# Patient Record
Sex: FEMALE | Race: WHITE | Employment: FULL TIME | URBAN - METROPOLITAN AREA
[De-identification: names, ages, dates, MRNs, and addresses within clinical notes are randomized per-mention and may not be internally consistent; named-entity substitution may affect disease eponyms.]

---

## 2017-07-27 ENCOUNTER — OFFICE VISIT (OUTPATIENT)
Dept: OBGYN CLINIC | Age: 22
End: 2017-07-27

## 2017-07-27 VITALS
DIASTOLIC BLOOD PRESSURE: 72 MMHG | HEIGHT: 65 IN | SYSTOLIC BLOOD PRESSURE: 110 MMHG | BODY MASS INDEX: 18.43 KG/M2 | WEIGHT: 110.6 LBS

## 2017-07-27 DIAGNOSIS — Z11.3 SCREENING EXAMINATION FOR VENEREAL DISEASE: Primary | ICD-10-CM

## 2017-07-27 DIAGNOSIS — Z01.419 ENCOUNTER FOR GYNECOLOGICAL EXAMINATION WITHOUT ABNORMAL FINDING: ICD-10-CM

## 2017-07-27 NOTE — PATIENT INSTRUCTIONS
Breast Self-Exam: Care Instructions  Your Care Instructions  A breast self-exam is when you check your breasts for lumps or changes. This regular exam helps you learn how your breasts normally look and feel. Most breast problems or changes are not because of cancer. Breast self-exam is not a substitute for a mammogram. Having regular breast exams by your doctor and regular mammograms improve your chances of finding any problems with your breasts. Some women set a time each month to do a step-by-step breast self-exam. Other women like a less formal system. They might look at their breasts as they brush their teeth, or feel their breasts once in a while in the shower. If you notice a change in your breast, tell your doctor. Follow-up care is a key part of your treatment and safety. Be sure to make and go to all appointments, and call your doctor if you are having problems. Its also a good idea to know your test results and keep a list of the medicines you take. How do you do a breast self-exam?  · The best time to examine your breasts is usually one week after your menstrual period begins. Your breasts should not be tender then. If you do not have periods, you might do your exam on a day of the month that is easy to remember. · To examine your breasts:  ¨ Remove all your clothes above the waist and lie down. When you are lying down, your breast tissue spreads evenly over your chest wall, which makes it easier to feel all your breast tissue. ¨ Use the pads--not the fingertips--of the 3 middle fingers of your left hand to check your right breast. Move your fingers slowly in small coin-sized circles that overlap. ¨ Use three levels of pressure to feel of all your breast tissue. Use light pressure to feel the tissue close to the skin surface. Use medium pressure to feel a little deeper. Use firm pressure to feel your tissue close to your breastbone and ribs.  Use each pressure level to feel your breast tissue before moving on to the next spot. ¨ Check your entire breast, moving up and down as if following a strip from the collarbone to the bra line, and from the armpit to the ribs. Repeat until you have covered the entire breast.  ¨ Repeat this procedure for your left breast, using the pads of the 3 middle fingers of your right hand. · To examine your breasts while in the shower:  ¨ Place one arm over your head and lightly soap your breast on that side. ¨ Using the pads of your fingers, gently move your hand over your breast (in the strip pattern described above), feeling carefully for any lumps or changes. ¨ Repeat for the other breast.  · Have your doctor inspect anything you notice to see if you need further testing. Where can you learn more? Go to http://jose de jesus-eulalio.info/. Enter P148 in the search box to learn more about \"Breast Self-Exam: Care Instructions. \"  Current as of: July 26, 2016  Content Version: 11.3  © 0657-0981 [employer]. Care instructions adapted under license by [employer] (which disclaims liability or warranty for this information). If you have questions about a medical condition or this instruction, always ask your healthcare professional. [place] [address] any warranty or liability for your use of this information.

## 2017-07-27 NOTE — PROGRESS NOTES
Johanna Fermin is a [de-identified] P5,  25 y.o. female [facility] whose Patient's last menstrual period was 07/17/2017 (exact date). who presents for her annual checkup. With regard to the Gardisil vaccine, she has received all 3 injections. Menstrual status:    Her periods are light in flow. She is using three to five pads or tampons per day, usually regular every 26-30 days. She denies dysmenorrhea. She reports no premenstrual symptoms. Contraception:    The current method of family planning is Ortho-Evra patches weekly and She declines contraception and counseling. Sexual history:    She  reports that she currently engages in sexual activity and has had male partners. Medical conditions:    Since her last annual GYN exam about one year ago, she has not the following changes in her health history: none. Pap and Mammogram History:    Her most recent Pap smear was normal obtained 7/7/2016    The patient has never had a mammogram.    The patient does not have a family history of breast cancer. Past Medical History:   Diagnosis Date    Acne     HPV vaccine counseling     Pt completed Gardasil series 10/2013     Past Surgical History:   Procedure Laterality Date    HX HEENT      Cyst removed        Current Outpatient Prescriptions   Medication Sig Dispense Refill    norelgestromin-ethinyl estradiol (ORTHO EVRA) 150-35 mcg/24 hr One patch weekly continuously, no patch free week. 12 Patch 5     Allergies: Review of patient's allergies indicates no known allergies. Social History     Social History    Marital status:      Spouse name: N/A    Number of children: N/A    Years of education: N/A     Occupational History    Not on file. Social History Main Topics    Smoking status: Never Smoker    Smokeless tobacco: Never Used    Alcohol use No    Drug use: Not on file    Sexual activity: Yes     Partners: Male      Comment: O.  Evra patch     Other Topics Concern    Not on file     Social History Narrative     Tobacco History:  reports that she has never smoked. She has never used smokeless tobacco.  Alcohol Abuse:  reports that she does not drink alcohol. Drug Abuse:  has no drug history on file. There is no problem list on file for this patient.       Review of Systems - History obtained from the patient  Constitutional: negative for weight loss, fever, night sweats  HEENT: negative for hearing loss, earache, congestion, snoring, sorethroat  CV: negative for chest pain, palpitations, edema  Resp: negative for cough, shortness of breath, wheezing  GI: negative for change in bowel habits, abdominal pain, black or bloody stools  : negative for frequency, dysuria, hematuria, vaginal discharge  MSK: negative for back pain, joint pain, muscle pain  Breast: negative for breast lumps, nipple discharge, galactorrhea  Skin :negative for itching, rash, hives  Neuro: negative for dizziness, headache, confusion, weakness  Psych: negative for anxiety, depression, change in mood  Heme/lymph: negative for bleeding, bruising, pallor    Physical Exam    Visit Vitals    /72    Ht 5' 5\" (1.651 m)    Wt 110 lb 9.6 oz (50.2 kg)    LMP 07/17/2017 (Exact Date)    BMI 18.4 kg/m2       Constitutional  · Appearance: well-nourished, well developed, alert, in no acute distress    HENT  · Head and Face: appears normal    Neck  · Inspection/Palpation: normal appearance, no masses or tenderness  · Lymph Nodes: no lymphadenopathy present  · Thyroid: gland size normal, nontender, no nodules or masses present on palpation    Chest  · Respiratory Effort: breathing normal  · Auscultation: normal breath sounds    Cardiovascular  · Heart:  · Auscultation: regular rate and rhythm without murmur    Breasts  · Inspection of Breasts: breasts symmetrical, no skin changes, no discharge present, nipple appearance normal, no skin retraction present  · Palpation of Breasts and Axillae: no masses present on palpation, no breast tenderness  · Axillary Lymph Nodes: no lymphadenopathy present    Gastrointestinal  · Abdominal Examination: abdomen non-tender to palpation, normal bowel sounds, no masses present  · Liver and spleen: no hepatomegaly present, spleen not palpable  · Hernias: no hernias identified    Genitourinary  · External Genitalia: normal appearance for age, no discharge present, no tenderness present, no inflammatory lesions present, no masses present, no atrophy present  · Vagina: normal vaginal vault without central or paravaginal defects, no discharge present, no inflammatory lesions present, no masses present  · Bladder: non-tender to palpation  · Urethra: appears normal  · Cervix: normal   · Uterus: normal size, shape and consistency  · Adnexa: no adnexal tenderness present, no adnexal masses present  · Perineum: perineum within normal limits, no evidence of trauma, no rashes or skin lesions present  · Anus: anus within normal limits, no hemorrhoids present  · Inguinal Lymph Nodes: no lymphadenopathy present    Skin  · General Inspection: no rash, no lesions identified    Neurologic/Psychiatric  · Mental Status:  · Orientation: grossly oriented to person, place and time  · Mood and Affect: mood normal, affect appropriate    . Assessment:  Routine gynecologic examination  Her current medical status is satisfactory with no evidence of significant gynecologic issues.     Plan:  Counseled re: diet, exercise, healthy lifestyle  Return for yearly wellness visits  [facility]

## 2017-07-30 LAB
C TRACH RRNA SPEC QL NAA+PROBE: NEGATIVE
N GONORRHOEA RRNA SPEC QL NAA+PROBE: NEGATIVE
T VAGINALIS RRNA SPEC QL NAA+PROBE: NEGATIVE

## 2017-10-19 ENCOUNTER — OFFICE VISIT (OUTPATIENT)
Dept: OBGYN CLINIC | Age: 22
End: 2017-10-19

## 2017-10-19 VITALS
BODY MASS INDEX: 18.33 KG/M2 | HEIGHT: 65 IN | DIASTOLIC BLOOD PRESSURE: 58 MMHG | SYSTOLIC BLOOD PRESSURE: 110 MMHG | WEIGHT: 110 LBS

## 2017-10-19 DIAGNOSIS — Z20.2 POSSIBLE EXPOSURE TO STD: Primary | ICD-10-CM

## 2017-10-19 DIAGNOSIS — Z11.3 SCREENING EXAMINATION FOR VENEREAL DISEASE: ICD-10-CM

## 2017-10-19 NOTE — PATIENT INSTRUCTIONS
Pelvic Exam: Care Instructions  Your Care Instructions    When your doctor examines all of your pelvic organs, it's called a pelvic exam. Two good reasons to have this kind of exam are to check for sexually transmitted infections (STIs) and to get a Pap test. A Pap test is also called a Pap smear. It checks for early changes that can lead to cancer of the cervix. Sometimes a pelvic exam is part of a regular checkup. In this case, you can do some things to make your test results as accurate as possible. · Try to schedule the exam when you don't have your period. · Don't use douches, tampons, or vaginal medicines, sprays, or powders for 24 hours before your exam.  · Don't have sex for 24 hours before your exam.  Other times, women have this kind of exam at any time of the month. This is because they have pelvic pain, bleeding, or discharge. Or they may have another pelvic problem. Before your exam, it's important to share some information with your doctor. For example, if you are a survivor of rape or sexual abuse, you can talk about any concerns you may have. Your doctor will also want to know if you are pregnant or use birth control. And he or she will want to hear about any problems, surgeries, or procedures you have had in your pelvic area. You will also need to tell your doctor when your last period was. Follow-up care is a key part of your treatment and safety. Be sure to make and go to all appointments, and call your doctor if you are having problems. It's also a good idea to know your test results and keep a list of the medicines you take. How is a pelvic exam done? · During a pelvic exam, you will:  ¨ Take off your clothes below the waist. You will get a paper or cloth cover to put over the lower half of your body. Zahida Christine on your back on an exam table. Your feet will be raised above you. Stirrups will support your feet. · The doctor will:  Maykel Hastings you to relax your knees.  Your knees need to lean out, toward the walls. ¨ Check the opening of your vagina for sores or swelling. ¨ Gently put a tool called a speculum into your vagina. It opens the vagina a little bit. You will feel some pressure. But if you are relaxed, it will not hurt. It lets your doctor see inside the vagina. ¨ Use a small brush, spatula, or swab to get a sample of cells, if you are having a Pap test or culture. The doctor then removes the speculum. ¨ Put on gloves and put one or two fingers of one hand into your vagina. The other hand goes on your lower belly. This lets your doctor feel your pelvic organs. You will probably feel some pressure. Try to stay relaxed. ¨ Put one gloved finger into your rectum and one into your vagina, if needed. This can also help check your pelvic organs. This exam takes about 10 minutes. At the end, you will get a washcloth or tissue to clean your vaginal area. It's normal to have some discharge after this exam. You can then get dressed. Some test results may be ready right away. But results from a culture or a Pap test may take several days or a few weeks. Why should you have a pelvic exam?  · You want to have recommended screening tests. This includes a Pap test.  · You think you have a vaginal infection. Signs include itching, burning, or unusual discharge. · You might have been exposed to a sexually transmitted infection (STI), such as chlamydia or herpes. · You have vaginal bleeding that is not part of your normal menstrual period. · You have pain in your belly or pelvis. · You have been sexually assaulted. A pelvic exam lets your doctor collect evidence and check for STIs. · You are pregnant. · You are having trouble getting pregnant. What are the risks of a pelvic exam?  There are no risks from a pelvic exam.  When should you call for help?   Watch closely for changes in your health, and be sure to contact your doctor if:  · You have heavy bleeding or discharge from your vagina after the exam.  Where can you learn more? Go to http://jose de jesus-eulalio.info/. Enter E695 in the search box to learn more about \"Pelvic Exam: Care Instructions. \"  Current as of: October 13, 2016  Content Version: 11.3  © 3150-1839 [employer]. Care instructions adapted under license by [employer] (which disclaims liability or warranty for this information). If you have questions about a medical condition or this instruction, always ask your healthcare professional. [place] [address] any warranty or liability for your use of this information.

## 2017-10-19 NOTE — PROGRESS NOTES
Chief Complaint   Exposure to STD      HPI  Trupti Vee is a 25 y.o. female who presents for the evaluation of possible STI. Patient's last menstrual period was 10/02/2017. She admits to  symptoms at this time. Patient states her tonsils swell and she gets a fever for a few days the last 2 months and is concerned she has an STD. She thinks her  may have cheated on her. The patient  denies risk factors for sexually-transmitted infection. She denies a history of having unprotected intercourse. Previous STD history: none    Past Medical History:   Diagnosis Date    Acne     HPV vaccine counseling     Pt completed Gardasil series 10/2013     Past Surgical History:   Procedure Laterality Date    HX HEENT      Cyst removed      Social History     Occupational History    Not on file. Social History Main Topics    Smoking status: Never Smoker    Smokeless tobacco: Never Used    Alcohol use No    Drug use: Not on file    Sexual activity: Yes     Partners: Male      Comment: O. Evra patch     History reviewed. No pertinent family history. No Known Allergies  Prior to Admission medications    Medication Sig Start Date End Date Taking?  Authorizing Provider   norelgestromin-ethinyl estradiol (ORTHO EVRA) 150-35 mcg/24 hr One patch weekly 7/27/17  Yes Brandon Morillo MD        Review of Systems: History obtained from the patient  Constitutional: negative for weight loss, fever, night sweats  Breast: negative for breast lumps, nipple discharge, galactorrhea  GI: negative for change in bowel habits, abdominal pain, black or bloody stools  : negative for frequency, dysuria, hematuria, vaginal discharge  MSK: negative for back pain, joint pain, muscle pain  Skin: negative for itching, rash, hives  Psych: negative for anxiety, depression, change in mood    Objective:  Visit Vitals    /58    Ht 5' 5\" (1.651 m)    Wt 110 lb (49.9 kg)    LMP 10/02/2017    BMI 18.3 kg/m2       PHYSICAL EXAMINATION    Constitutional  · Appearance: well-nourished, well developed, alert, in no acute distress    Gastrointestinal  · Abdominal Examination: abdomen non-tender to palpation, normal bowel sounds, no masses present  · Liver and spleen: no hepatomegaly present, spleen not palpable  · Hernias: no hernias identified    Genitourinary  · External Genitalia: normal appearance for age, no discharge present, no tenderness present, no inflammatory lesions present, no masses present, no atrophy present  · Vagina: normal vaginal vault without central or paravaginal defects, no discharge present, no inflammatory lesions present, no masses present  · Bladder: non-tender to palpation  · Urethra: appears normal  · Cervix: normal   · Uterus: normal size, shape and consistency  · Adnexa: no adnexal tenderness present, no adnexal masses present  · Perineum: perineum within normal limits, no evidence of trauma, no rashes or skin lesions present  · Anus: anus within normal limits, no hemorrhoids present  · Inguinal Lymph Nodes: no lymphadenopathy present    Skin  · General Inspection: no rash, no lesions identified    Neurologic/Psychiatric  · Mental Status:  · Orientation: grossly oriented to person, place and time  · Mood and Affect: mood normal, affect appropriate      . Assesment:   STD exposure    Plan:   GC and chlamydia DNA  probe sent to lab. Blood STD testing    We discussed STI issues including treatment options, the necessity of treating partners and prevention of transmission. ROV prn if symptoms persist or worsen.

## 2017-10-20 LAB
COMMENT, 144067: NORMAL
HBV SURFACE AG SERPL QL IA: NEGATIVE
HCV AB S/CO SERPL IA: <0.1 S/CO RATIO (ref 0–0.9)
HIV 1+2 AB+HIV1 P24 AG SERPL QL IA: NON REACTIVE
HSV2 IGG SER IA-ACNC: <0.91 INDEX (ref 0–0.9)
RPR SER QL: NON REACTIVE

## 2018-09-24 ENCOUNTER — OFFICE VISIT (OUTPATIENT)
Dept: OBGYN CLINIC | Age: 23
End: 2018-09-24

## 2018-09-24 VITALS
SYSTOLIC BLOOD PRESSURE: 112 MMHG | DIASTOLIC BLOOD PRESSURE: 60 MMHG | HEIGHT: 65 IN | BODY MASS INDEX: 19.39 KG/M2 | WEIGHT: 116.4 LBS

## 2018-09-24 DIAGNOSIS — Z11.3 SCREENING EXAMINATION FOR VENEREAL DISEASE: ICD-10-CM

## 2018-09-24 DIAGNOSIS — Z01.419 ENCOUNTER FOR GYNECOLOGICAL EXAMINATION WITHOUT ABNORMAL FINDING: Primary | ICD-10-CM

## 2018-09-24 NOTE — PATIENT INSTRUCTIONS
Breast Self-Exam: Care Instructions  Your Care Instructions    A breast self-exam is when you check your breasts for lumps or changes. This regular exam helps you learn how your breasts normally look and feel. Most breast problems or changes are not because of cancer. Breast self-exam is not a substitute for a mammogram. Having regular breast exams by your doctor and regular mammograms improve your chances of finding any problems with your breasts. Some women set a time each month to do a step-by-step breast self-exam. Other women like a less formal system. They might look at their breasts as they brush their teeth, or feel their breasts once in a while in the shower. If you notice a change in your breast, tell your doctor. Follow-up care is a key part of your treatment and safety. Be sure to make and go to all appointments, and call your doctor if you are having problems. It's also a good idea to know your test results and keep a list of the medicines you take. How do you do a breast self-exam?  · The best time to examine your breasts is usually one week after your menstrual period begins. Your breasts should not be tender then. If you do not have periods, you might do your exam on a day of the month that is easy to remember. · To examine your breasts:  ¨ Remove all your clothes above the waist and lie down. When you are lying down, your breast tissue spreads evenly over your chest wall, which makes it easier to feel all your breast tissue. ¨ Use the pads-not the fingertips-of the 3 middle fingers of your left hand to check your right breast. Move your fingers slowly in small coin-sized circles that overlap. ¨ Use three levels of pressure to feel of all your breast tissue. Use light pressure to feel the tissue close to the skin surface. Use medium pressure to feel a little deeper. Use firm pressure to feel your tissue close to your breastbone and ribs.  Use each pressure level to feel your breast tissue before moving on to the next spot. ¨ Check your entire breast, moving up and down as if following a strip from the collarbone to the bra line, and from the armpit to the ribs. Repeat until you have covered the entire breast.  ¨ Repeat this procedure for your left breast, using the pads of the 3 middle fingers of your right hand. · To examine your breasts while in the shower:  ¨ Place one arm over your head and lightly soap your breast on that side. ¨ Using the pads of your fingers, gently move your hand over your breast (in the strip pattern described above), feeling carefully for any lumps or changes. ¨ Repeat for the other breast.  · Have your doctor inspect anything you notice to see if you need further testing. Where can you learn more? Go to http://jose de jesus-eulalio.info/. Enter P148 in the search box to learn more about \"Breast Self-Exam: Care Instructions. \"  Current as of: May 12, 2017  Content Version: 11.7  © 9467-5757 [employer]. Care instructions adapted under license by [employer] (which disclaims liability or warranty for this information). If you have questions about a medical condition or this instruction, always ask your healthcare professional. [place] [address] any warranty or liability for your use of this information.

## 2018-09-24 NOTE — PROGRESS NOTES
Cesar Rodriguez is a [de-identified] P5,  21 y.o. female [facility]– [place] whose Patient's last menstrual period was 09/18/2018 (approximate). who presents for her annual checkup. She is having significant anxiety the week of her period. With regard to the Gardisil vaccine, she has received all 3 injections. Menstrual status:    Her periods are normal in flow. She is using three to five pads or tampons per day, usually regular and occur every 26-30 days. She denies dysmenorrhea. She reports no premenstrual symptoms. Contraception:    The current method of family planning is Ortho-Evra patches weekly. Sexual history:    She  reports that she currently engages in sexual activity and has had male partners. Medical conditions:    Since her last annual GYN exam about one year ago, she has not the following changes in her health history: none. Pap and Mammogram History:    Her most recent Pap smear was normal obtained 7/7/2016. The patient has never had a mammogram.    The patient does not have a family history of breast cancer. Past Medical History:   Diagnosis Date    Acne     HPV vaccine counseling     Pt completed Gardasil series 10/2013     Past Surgical History:   Procedure Laterality Date    HX HEENT      Cyst removed        Current Outpatient Prescriptions   Medication Sig Dispense Refill    norelgestromin-ethinyl estradiol (ORTHO EVRA) 150-35 mcg/24 hr One patch weekly 3 Patch 12     Allergies: Review of patient's allergies indicates no known allergies. Social History     Social History    Marital status:      Spouse name: N/A    Number of children: N/A    Years of education: N/A     Occupational History    Not on file. Social History Main Topics    Smoking status: Never Smoker    Smokeless tobacco: Never Used    Alcohol use No    Drug use: Not on file    Sexual activity: Yes     Partners: Male      Comment: O.  Evra patch     Other Topics Concern    Not on file     Social History Narrative     Tobacco History:  reports that she has never smoked. She has never used smokeless tobacco.  Alcohol Abuse:  reports that she does not drink alcohol. Drug Abuse:  has no drug history on file. There is no problem list on file for this patient.       Review of Systems - History obtained from the patient  Constitutional: negative for weight loss, fever, night sweats  HEENT: negative for hearing loss, earache, congestion, snoring, sorethroat  CV: negative for chest pain, palpitations, edema  Resp: negative for cough, shortness of breath, wheezing  GI: negative for change in bowel habits, abdominal pain, black or bloody stools  : negative for frequency, dysuria, hematuria, vaginal discharge  MSK: negative for back pain, joint pain, muscle pain  Breast: negative for breast lumps, nipple discharge, galactorrhea  Skin :negative for itching, rash, hives  Neuro: negative for dizziness, headache, confusion, weakness  Psych: negative for anxiety, depression, change in mood  Heme/lymph: negative for bleeding, bruising, pallor    Physical Exam    Visit Vitals    /60 (BP 1 Location: Left arm, BP Patient Position: Sitting)    Ht 5' 5\" (1.651 m)    Wt 116 lb 6.4 oz (52.8 kg)    LMP 09/18/2018 (Approximate)    BMI 19.37 kg/m2       Constitutional  · Appearance: well-nourished, well developed, alert, in no acute distress    HENT  · Head and Face: appears normal    Neck  · Inspection/Palpation: normal appearance, no masses or tenderness  · Lymph Nodes: no lymphadenopathy present  · Thyroid: gland size normal, nontender, no nodules or masses present on palpation    Chest  · Respiratory Effort: breathing normal  · Auscultation: normal breath sounds    Cardiovascular  · Heart:  · Auscultation: regular rate and rhythm without murmur    Breasts  · Inspection of Breasts: breasts symmetrical, no skin changes, no discharge present, nipple appearance normal, no skin retraction present  · Palpation of Breasts and Axillae: no masses present on palpation, no breast tenderness  · Axillary Lymph Nodes: no lymphadenopathy present    Gastrointestinal  · Abdominal Examination: abdomen non-tender to palpation, normal bowel sounds, no masses present  · Liver and spleen: no hepatomegaly present, spleen not palpable  · Hernias: no hernias identified    Genitourinary  · External Genitalia: normal appearance for age, no discharge present, no tenderness present, no inflammatory lesions present, no masses present, no atrophy present  · Vagina: normal vaginal vault without central or paravaginal defects, no discharge present, no inflammatory lesions present, no masses present  · Bladder: non-tender to palpation  · Urethra: appears normal  · Cervix: normal   · Uterus: normal size, shape and consistency  · Adnexa: no adnexal tenderness present, no adnexal masses present  · Perineum: perineum within normal limits, no evidence of trauma, no rashes or skin lesions present  · Anus: anus within normal limits, no hemorrhoids present  · Inguinal Lymph Nodes: no lymphadenopathy present    Skin  · General Inspection: no rash, no lesions identified    Neurologic/Psychiatric  · Mental Status:  · Orientation: grossly oriented to person, place and time  · Mood and Affect: mood normal, affect appropriate    . Assessment:  Routine gynecologic examination  Her current medical status is satisfactory with no evidence of significant gynecologic issues.     Plan:  Counseled re: diet, exercise, healthy lifestyle  Return for yearly wellness visits  Pap/GCC  May want conception  Cont Evra

## 2018-09-27 LAB
C TRACH RRNA CVX QL NAA+PROBE: NEGATIVE
CYTOLOGIST CVX/VAG CYTO: NORMAL
CYTOLOGY CVX/VAG DOC THIN PREP: NORMAL
DX ICD CODE: NORMAL
LABCORP, 190119: NORMAL
Lab: NORMAL
N GONORRHOEA RRNA CVX QL NAA+PROBE: NEGATIVE
OTHER STN SPEC: NORMAL
PATH REPORT.FINAL DX SPEC: NORMAL
PATHOLOGIST CVX/VAG CYTO: NORMAL
STAT OF ADQ CVX/VAG CYTO-IMP: NORMAL

## 2019-09-30 ENCOUNTER — OFFICE VISIT (OUTPATIENT)
Dept: OBGYN CLINIC | Age: 24
End: 2019-09-30

## 2019-09-30 VITALS
WEIGHT: 116 LBS | BODY MASS INDEX: 19.33 KG/M2 | HEIGHT: 65 IN | SYSTOLIC BLOOD PRESSURE: 112 MMHG | DIASTOLIC BLOOD PRESSURE: 59 MMHG

## 2019-09-30 DIAGNOSIS — Z11.3 SCREENING EXAMINATION FOR VENEREAL DISEASE: ICD-10-CM

## 2019-09-30 DIAGNOSIS — Z01.419 ENCOUNTER FOR GYNECOLOGICAL EXAMINATION WITHOUT ABNORMAL FINDING: Primary | ICD-10-CM

## 2019-09-30 NOTE — PROGRESS NOTES
Ad Saenz is a [de-identified] P5,  25 y.o. female [facility] whose Patient's last menstrual period was 09/20/2019 (approximate). was on 9/20/2019 who presents for her annual checkup. She is having no significant problems. With regard to the Gardisil vaccine, she has received all 3 injections. Menstrual status:    Her periods are moderate in flow. She is using three to five pads or tampons per day, usually regular every 26-30 days. She denies dysmenorrhea. She reports no premenstrual symptoms. Contraception:    The current method of family planning is Ortho-Evra patches weekly and She declines contraception and counseling. Sexual history:    She  reports that she currently engages in sexual activity and has had partner(s) who are Male. Medical conditions:    Since her last annual GYN exam about 9/24/18 ago, she has not the following changes in her health history: none. Pap and Mammogram History:    Her most recent Pap smear was normal obtained 9/24/18 year(s) ago. The patient has never had a mammogram.    The patient does not have a family history of breast cancer. Past Medical History:   Diagnosis Date    Acne     HPV vaccine counseling     Pt completed Gardasil series 10/2013     Past Surgical History:   Procedure Laterality Date    HX HEENT      Cyst removed        Current Outpatient Medications   Medication Sig Dispense Refill    norelgestromin-ethinyl estradiol (ORTHO EVRA) 150-35 mcg/24 hr One patch weekly 9 Patch 4     Allergies: Patient has no known allergies.    Social History     Socioeconomic History    Marital status:      Spouse name: Not on file    Number of children: Not on file    Years of education: Not on file    Highest education level: Not on file   Occupational History    Not on file   Social Needs    Financial resource strain: Not on file    Food insecurity:     Worry: Not on file     Inability: Not on file    Transportation needs: Medical: Not on file     Non-medical: Not on file   Tobacco Use    Smoking status: Never Smoker    Smokeless tobacco: Never Used   Substance and Sexual Activity    Alcohol use: No    Drug use: Not on file    Sexual activity: Yes     Partners: Male     Comment: O. Evra patch   Lifestyle    Physical activity:     Days per week: Not on file     Minutes per session: Not on file    Stress: Not on file   Relationships    Social connections:     Talks on phone: Not on file     Gets together: Not on file     Attends Sabianist service: Not on file     Active member of club or organization: Not on file     Attends meetings of clubs or organizations: Not on file     Relationship status: Not on file    Intimate partner violence:     Fear of current or ex partner: Not on file     Emotionally abused: Not on file     Physically abused: Not on file     Forced sexual activity: Not on file   Other Topics Concern    Not on file   Social History Narrative    Not on file     Tobacco History:  reports that she has never smoked. She has never used smokeless tobacco.  Alcohol Abuse:  reports that she does not drink alcohol. Drug Abuse:  has no drug history on file. There is no problem list on file for this patient.       Review of Systems - History obtained from the patient  Constitutional: negative for weight loss, fever, night sweats  HEENT: negative for hearing loss, earache, congestion, snoring, sorethroat  CV: negative for chest pain, palpitations, edema  Resp: negative for cough, shortness of breath, wheezing  GI: negative for change in bowel habits, abdominal pain, black or bloody stools  : negative for frequency, dysuria, hematuria, vaginal discharge  MSK: negative for back pain, joint pain, muscle pain  Breast: negative for breast lumps, nipple discharge, galactorrhea  Skin :negative for itching, rash, hives  Neuro: negative for dizziness, headache, confusion, weakness  Psych: negative for anxiety, depression, change in mood  Heme/lymph: negative for bleeding, bruising, pallor    Physical Exam    Visit Vitals  /59   Ht 5' 5\" (1.651 m)   Wt 116 lb (52.6 kg)   LMP 09/20/2019 (Approximate)   BMI 19.30 kg/m²       Constitutional  · Appearance: well-nourished, well developed, alert, in no acute distress    HENT  · Head and Face: appears normal    Neck  · Inspection/Palpation: normal appearance, no masses or tenderness  · Lymph Nodes: no lymphadenopathy present  · Thyroid: gland size normal, nontender, no nodules or masses present on palpation    Chest  · Respiratory Effort: breathing normal  · Auscultation: normal breath sounds    Cardiovascular  · Heart:  · Auscultation: regular rate and rhythm without murmur    Breasts  · Inspection of Breasts: breasts symmetrical, no skin changes, no discharge present, nipple appearance normal, no skin retraction present  · Palpation of Breasts and Axillae: no masses present on palpation, no breast tenderness  · Axillary Lymph Nodes: no lymphadenopathy present    Gastrointestinal  · Abdominal Examination: abdomen non-tender to palpation, normal bowel sounds, no masses present  · Liver and spleen: no hepatomegaly present, spleen not palpable  · Hernias: no hernias identified    Genitourinary  · External Genitalia: normal appearance for age, no discharge present, no tenderness present, no inflammatory lesions present, no masses present, no atrophy present  · Vagina: normal vaginal vault without central or paravaginal defects, no discharge present, no inflammatory lesions present, no masses present  · Bladder: non-tender to palpation  · Urethra: appears normal  · Cervix: normal   · Uterus: normal size, shape and consistency  · Adnexa: no adnexal tenderness present, no adnexal masses present  · Perineum: perineum within normal limits, no evidence of trauma, no rashes or skin lesions present  · Anus: anus within normal limits, no hemorrhoids present  · Inguinal Lymph Nodes: no lymphadenopathy present    Skin  · General Inspection: no rash, no lesions identified    Neurologic/Psychiatric  · Mental Status:  · Orientation: grossly oriented to person, place and time  · Mood and Affect: mood normal, affect appropriate    . Assessment:  Routine gynecologic examination  Her current medical status is satisfactory with no evidence of significant gynecologic issues.     Plan:  Counseled re: diet, exercise, healthy lifestyle  Return for yearly wellness visits  May want conception - start vits  Cont Evra

## 2019-09-30 NOTE — PATIENT INSTRUCTIONS
Well Visit, Ages 25 to 48: Care Instructions  Your Care Instructions    Physical exams can help you stay healthy. Your doctor has checked your overall health and may have suggested ways to take good care of yourself. He or she also may have recommended tests. At home, you can help prevent illness with healthy eating, regular exercise, and other steps. Follow-up care is a key part of your treatment and safety. Be sure to make and go to all appointments, and call your doctor if you are having problems. It's also a good idea to know your test results and keep a list of the medicines you take. How can you care for yourself at home? · Reach and stay at a healthy weight. This will lower your risk for many problems, such as obesity, diabetes, heart disease, and high blood pressure. · Get at least 30 minutes of physical activity on most days of the week. Walking is a good choice. You also may want to do other activities, such as running, swimming, cycling, or playing tennis or team sports. Discuss any changes in your exercise program with your doctor. · Do not smoke or allow others to smoke around you. If you need help quitting, talk to your doctor about stop-smoking programs and medicines. These can increase your chances of quitting for good. · Talk to your doctor about whether you have any risk factors for sexually transmitted infections (STIs). Having one sex partner (who does not have STIs and does not have sex with anyone else) is a good way to avoid these infections. · Use birth control if you do not want to have children at this time. Talk with your doctor about the choices available and what might be best for you. · Protect your skin from too much sun. When you're outdoors from 10 a.m. to 4 p.m., stay in the shade or cover up with clothing and a hat with a wide brim. Wear sunglasses that block UV rays. Even when it's cloudy, put broad-spectrum sunscreen (SPF 30 or higher) on any exposed skin.   · See a dentist one or two times a year for checkups and to have your teeth cleaned. · Wear a seat belt in the car. Follow your doctor's advice about when to have certain tests. These tests can spot problems early. For everyone  · Cholesterol. Have the fat (cholesterol) in your blood tested after age 21. Your doctor will tell you how often to have this done based on your age, family history, or other things that can increase your risk for heart disease. · Blood pressure. Have your blood pressure checked during a routine doctor visit. Your doctor will tell you how often to check your blood pressure based on your age, your blood pressure results, and other factors. · Vision. Talk with your doctor about how often to have a glaucoma test.  · Diabetes. Ask your doctor whether you should have tests for diabetes. · Colon cancer. Your risk for colorectal cancer gets higher as you get older. Some experts say that adults should start regular screening at age 48 and stop at age 76. Others say to start before age 48 or continue after age 76. Talk with your doctor about your risk and when to start and stop screening. For women  · Breast exam and mammogram. Talk to your doctor about when you should have a clinical breast exam and a mammogram. Medical experts differ on whether and how often women under 50 should have these tests. Your doctor can help you decide what is right for you. · Cervical cancer screening test and pelvic exam. Begin with a Pap test at age 24. The test often is part of a pelvic exam. Starting at age 27, you may choose to have a Pap test, an HPV test, or both tests at the same time (called co-testing). Talk with your doctor about how often to have testing. · Tests for sexually transmitted infections (STIs). Ask whether you should have tests for STIs. You may be at risk if you have sex with more than one person, especially if your partners do not wear condoms.   For men  · Tests for sexually transmitted infections (STIs). Ask whether you should have tests for STIs. You may be at risk if you have sex with more than one person, especially if you do not wear a condom. · Testicular cancer exam. Ask your doctor whether you should check your testicles regularly. · Prostate exam. Talk to your doctor about whether you should have a blood test (called a PSA test) for prostate cancer. Experts differ on whether and when men should have this test. Some experts suggest it if you are older than 39 and are -American or have a father or brother who got prostate cancer when he was younger than 72. When should you call for help? Watch closely for changes in your health, and be sure to contact your doctor if you have any problems or symptoms that concern you. Where can you learn more? Go to http://jose de jesus-eulalio.info/. Enter P072 in the search box to learn more about \"Well Visit, Ages 25 to 48: Care Instructions. \"  Current as of: December 13, 2018  Content Version: 12.2  © 1253-7091 [employer]. Care instructions adapted under license by [employer] (which disclaims liability or warranty for this information). If you have questions about a medical condition or this instruction, always ask your healthcare professional. [place] [address] any warranty or liability for your use of this information.

## 2019-10-03 LAB
C TRACH RRNA SPEC QL NAA+PROBE: NEGATIVE
N GONORRHOEA RRNA SPEC QL NAA+PROBE: NEGATIVE
T VAGINALIS DNA SPEC QL NAA+PROBE: NEGATIVE

## 2019-12-23 ENCOUNTER — OFFICE VISIT (OUTPATIENT)
Dept: OBGYN CLINIC | Age: 24
End: 2019-12-23

## 2019-12-23 VITALS
BODY MASS INDEX: 20.16 KG/M2 | HEIGHT: 65 IN | SYSTOLIC BLOOD PRESSURE: 109 MMHG | WEIGHT: 121 LBS | DIASTOLIC BLOOD PRESSURE: 64 MMHG

## 2019-12-23 DIAGNOSIS — Z34.00 SUPERVISION OF NORMAL FIRST PREGNANCY, ANTEPARTUM: Primary | ICD-10-CM

## 2019-12-23 DIAGNOSIS — Z23 ENCOUNTER FOR IMMUNIZATION: ICD-10-CM

## 2019-12-23 LAB
ANTIBODY SCREEN, EXTERNAL: NEGATIVE
BILIRUB UR QL STRIP: NEGATIVE
CHLAMYDIA, EXTERNAL: NEGATIVE
GLUCOSE UR-MCNC: NEGATIVE MG/DL
HBSAG, EXTERNAL: NEGATIVE
HCT, EXTERNAL: 33.7
HGB, EXTERNAL: 11.6
HIV, EXTERNAL: NORMAL
KETONES P FAST UR STRIP-MCNC: NEGATIVE MG/DL
N. GONORRHEA, EXTERNAL: NEGATIVE
PAP SMEAR, EXTERNAL: NORMAL
PH UR STRIP: NORMAL [PH] (ref 4.6–8)
PLATELET CNT,   EXTERNAL: 236
PROT UR QL STRIP: NEGATIVE
RUBELLA, EXTERNAL: NORMAL
SP GR UR STRIP: NORMAL (ref 1–1.03)
T. PALLIDUM, EXTERNAL: NEGATIVE
TYPE, ABO & RH, EXTERNAL: NORMAL
UA UROBILINOGEN AMB POC: NORMAL (ref 0.2–1)
URINALYSIS CLARITY POC: CLEAR
URINALYSIS COLOR POC: NORMAL
URINE BLOOD POC: NEGATIVE
URINE LEUKOCYTES POC: NEGATIVE
URINE NITRITES POC: NEGATIVE

## 2019-12-23 NOTE — PROGRESS NOTES
Current pregnancy history:    Francisco Javier Jenkins is a 25 y.o. female who presents for the evaluation of pregnancy. Patient's last menstrual period was 11/04/2019 (approximate). LMP history:  The date of her LMP is  certain. Her last menstrual period was normal and lasted for 4 to 5 days. A urine pregnancy test was positive 2-3 weeks ago. She was not on the pill at conception. Based on her LMP, her EDC is 8/10/20 and her EGA is 7 weeks,0 days. Her menstrual cycles are regular and occur approximately every 28 days  and range from 3 to 5 days. The last menses lasted  the usual number of days. Ultrasound data:  She had an  ultrasound done by the ultrasound tech today which revealed a viable sanchez pregnancy with a gestational age of 7 weeks and 0 days giving an EDC of 8/17/20. TA/TV ULTRASOUND PERFORMED  A SINGLE VIABLE 6W0D IUP IS SEEN. GESTATIONAL AGE BASED ON TODAYS ULTRASOUND. THE GS HAS AN IRREGULAR CONTOUR. A NORMAL YOLK SAC IS SEEN. RIGHT OVARY APPEARS WITHIN NORMAL LIMITS. LEFT OVARY APPEARS TO HAVE A 1.9 X 1.3CM HEMORRHAGIC CL CYST.  NO FREE FLUID SEEN IN THE CDS. Pregnancy symptoms:    Since her LMP she has experienced  urinary frequency, breast tenderness, and nausea. She has not been vomiting over the last few weeks. Associated signs and symptoms which she denies: dysuria, discharge, vaginal bleeding. She states she has gained weight:  Approximately 5 pounds over the last few weeks. Relevant past pregnancy history:   She has the following pregnancy history: None      Relevant past medical history:(relevant to this pregnancy): noncontributory. C/o light spotting, nausea- denies vomiting  Would like to discuss genetics        Pap/Occupational history:  Last pap smear: 9/24/18 Results: Normal     Her occupation is: Pharmacy Tech. Substance history: negative for alcohol, tobacco and street drugs. Positive for nothing. Exposure history:  There are no indoor cat/s in the home. She adenies close contact with children on a regular basis. She has had chicken pox  in the past.   Patient denies issues with domestic violence. Genetic Screening/Teratology Counseling: (Includes patient, baby's father, or anyone in either family with:)  3.  Patient's age >/= 28 at [facility]?-- no  .   2. Thalassemia ([facility], [place], [address], or  background): MCV<80?--no.     3.  Neural tube defect (meningomyelocele, spina bifida, anencephaly)?--no.   4.  Congenital heart defect?--no.  5.  Down syndrome?--no.   6.  Aba-Sachs ([facility])?--no.   7.  Canavan's Disease?--no.   8.  Familial Dysautonomia?--no.   9.  Sickle cell disease or trait ()? --no   The patient has not been tested for sickle trait  10. Hemophilia or other blood disorders?--no. 11.  Muscular dystrophy?--no. 12.  Cystic fibrosis?--no. 13.  Tippecanoe's Chorea?--no. 14.  Mental retardation/autism (if yes was person tested for Fragile X)?--no. 15.  Other inherited genetic or chromosomal disorder?--no. 12.  Maternal metabolic disorder (DM, PKU, etc)?--no. 17.  Patient or FOB with a child with a birth defect not listed above?--no.  17a. Patient or FOB with a birth defect themselves?--no. 18.  Recurrent pregnancy loss, or stillbirth?--no. 19.  Any medications since LMP other than prenatal vitamins (include vitamins,  supplements, OTC meds, drugs, alcohol)?--no. 20.  Any other genetic/environmental exposure to discuss?--no. Infection History:  1. Lives with someone with TB or TB exposed?--no.   2.  Patient or partner has history of genital herpes?--no.  3.  Rash or viral illness since LMP?--no.    4.  History of STD (GC, CT, HPV, syphilis, HIV)? --no   5.  Other: OTHER?      OB History    Para Term  AB Living   1 0 0 0 0 0   SAB TAB Ectopic Molar Multiple Live Births   0 0 0 0 0 0      # Outcome Date GA Lbr Kishore/2nd Weight Sex Delivery Anes PTL Lv   1 Current Past Medical History:   Diagnosis Date    Acne     HPV vaccine counseling     Pt completed Gardasil series 10/2013     Past Surgical History:   Procedure Laterality Date    HX HEENT      Cyst removed      Social History     Occupational History    Not on file   Tobacco Use    Smoking status: Never Smoker    Smokeless tobacco: Never Used   Substance and Sexual Activity    Alcohol use: No    Drug use: Not on file    Sexual activity: Yes     Partners: Male     Comment: O. Evra patch     History reviewed. No pertinent family history. No Known Allergies  Prior to Admission medications    Medication Sig Start Date End Date Taking?  Authorizing Provider   norelgestromin-ethinyl estradiol (ORTHO EVRA) 150-35 mcg/24 hr One patch weekly 9/30/19   Dennis Rodriguez MD        Review of Systems: History obtained from the patient  Constitutional: negative for weight loss, fever, night sweats  HEENT: negative for hearing loss, earache, congestion, snoring, sorethroat  CV: negative for chest pain, palpitations, edema  Resp: negative for cough, shortness of breath, wheezing  Breast: negative for breast lumps, nipple discharge, galactorrhea  GI: negative for change in bowel habits, abdominal pain, black or bloody stools  : negative for frequency, dysuria, hematuria, vaginal discharge  MSK: negative for back pain, joint pain, muscle pain  Skin: negative for itching, rash, hives  Neuro: negative for dizziness, headache, confusion, weakness  Psych: negative for anxiety, depression, change in mood  Heme/lymph: negative for bleeding, bruising, pallor    Objective:  Visit Vitals  /64   Ht 5' 5\" (1.651 m)   Wt 121 lb (54.9 kg)   LMP 11/04/2019 (Approximate)   BMI 20.14 kg/m²       Physical Exam:   PHYSICAL EXAMINATION    Constitutional  · Appearance: well-nourished, well developed, alert, in no acute distress    HENT  · Head  · Face: appears normal  · Eyes: appear normal  · Ears: normal  · Mouth: normal  · Lips: no lesions    Neck  · Inspection/Palpation: normal appearance, no masses or tenderness  · Lymph Nodes: no lymphadenopathy present  · Thyroid: gland size normal, nontender, no nodules or masses present on palpation    Chest  · Respiratory Effort: breathing unlabored  · Auscultation: normal breath sounds    Cardiovascular  · Heart:  · Auscultation: regular rate and rhythm without murmur    Breasts  · Inspection of Breasts: breasts symmetrical, no skin changes, no discharge present, nipple appearance normal, no skin retraction present  · Palpation of Breasts and Axillae: no masses present on palpation, no breast tenderness  · Axillary Lymph Nodes: no lymphadenopathy present    Gastrointestinal  · Abdominal Examination: abdomen non-tender to palpation, normal bowel sounds, no masses present  · Liver and spleen: no hepatomegaly present, spleen not palpable  · Hernias: no hernias identified    Genitourinary  · External Genitalia: normal appearance for age, no discharge present, no tenderness present, no inflammatory lesions present, no masses present, no atrophy present  · Vagina: normal vaginal vault without central or paravaginal defects, no discharge present, no inflammatory lesions present, no masses present  · Bladder: non-tender to palpation  · Urethra: appears normal  · Cervix: normal   · Uterus: enlarged, normal shape, soft  · Adnexa: no adnexal tenderness present, no adnexal masses present  · Perineum: perineum within normal limits, no evidence of trauma, no rashes or skin lesions present  · Anus: anus within normal limits, no hemorrhoids present  · Inguinal Lymph Nodes: no lymphadenopathy present    Skin  · General Inspection: no rash, no lesions identified    Neurologic/Psychiatric  · Mental Status:  · Orientation: grossly oriented to person, place and time  · Mood and Affect: mood normal, affect appropriate    Assessment:   Intrauterine pregnancy with the following problems identified:   EDC 8/17/2020 by US  Flu vaccine 2019  [facility]  ? NIPTS        Plan:     Offered CF testing, CVS, Nuchal Translucency, MSAFP, amnio, and discussed NIPT  Course of pregnancy discussed including visit schedule, routine U/S, glucola testing, etc.  Avoid alcoholic beverages and illicit/recreational drugs use  Take prenatal vitamins or folic acid daily. Hospital and practice style discussed with coverage system. Discussed nutrition, toxoplasmosis precautions, sexual activity, exercise, need for influenza vaccine, environmental and work hazards, travel advice, screen for domestic violence, need for seat belts. Discussed seafood, unpasteurized dairy products, deli meat, artificial sweeteners, and caffeine. Information on prenatal classes/breastfeeding given. Information on circumcision given  Patient encouraged not to smoke. Discussed current prescription drug use. Given medication list.  Discussed the use of over the counter medications and chemicals. Route of delivery discussed, including risks, benefits, and alternatives of  versus repeat LTCS. Pt understands risk of hemorrhage during pregnancy and post delivery and would accept blood products if necessary in life-threatening emergencies      Handouts given to pt.

## 2019-12-25 LAB
ABO GROUP BLD: NORMAL
BACTERIA UR CULT: NO GROWTH
BLD GP AB SCN SERPL QL: NORMAL
HBV SURFACE AG SERPL QL IA: NORMAL
HCT VFR BLD AUTO: NORMAL %
HGB BLD-MCNC: NORMAL G/DL
HIV 1+2 AB+HIV1 P24 AG SERPL QL IA: NORMAL
NRBC BLD AUTO-RTO: NORMAL %
PLATELET # BLD AUTO: NORMAL 10*3/UL
RBC # BLD AUTO: NORMAL 10*6/UL
RH BLD: NORMAL
RUBV IGG SERPL IA-ACNC: NORMAL INDEX
TREPONEMA PALLIDUM IGG+IGM AB [PRESENCE] IN SERUM OR PLASMA BY IMMUNOASSAY: NORMAL
WBC # BLD AUTO: NORMAL X10E3/UL

## 2019-12-26 ENCOUNTER — TELEPHONE (OUTPATIENT)
Dept: OBGYN CLINIC | Age: 24
End: 2019-12-26

## 2019-12-26 LAB
ABO GROUP BLD: NORMAL
BACTERIA UR CULT: NORMAL
BLD GP AB SCN SERPL QL: NEGATIVE
ERYTHROCYTE [DISTWIDTH] IN BLOOD BY AUTOMATED COUNT: 11.9 % (ref 12.3–15.4)
HBV SURFACE AG SERPL QL IA: NEGATIVE
HCT VFR BLD AUTO: 33.7 % (ref 34–46.6)
HGB BLD-MCNC: 11.6 G/DL (ref 11.1–15.9)
HIV 1+2 AB+HIV1 P24 AG SERPL QL IA: NON REACTIVE
MCH RBC QN AUTO: 31.2 PG (ref 26.6–33)
MCHC RBC AUTO-ENTMCNC: 34.4 G/DL (ref 31.5–35.7)
MCV RBC AUTO: 91 FL (ref 79–97)
PLATELET # BLD AUTO: 236 X10E3/UL (ref 150–450)
RBC # BLD AUTO: 3.72 X10E6/UL (ref 3.77–5.28)
RH BLD: POSITIVE
RUBV IGG SERPL IA-ACNC: 1.52 INDEX
TREPONEMA PALLIDUM IGG+IGM AB [PRESENCE] IN SERUM OR PLASMA BY IMMUNOASSAY: NON REACTIVE
WBC # BLD AUTO: 5.6 X10E3/UL (ref 3.4–10.8)

## 2019-12-26 NOTE — TELEPHONE ENCOUNTER
Patient of [facility] [address] Calling but TH is out of office. Sending message to covering MD, FW (am). Patient had intercourse on 12-24-19 and then she started with vaginal bleeding and slight cramping. She had her EOB on 12/23/19. She said that she had some soaking of the middle of her pad the day of the intercourse and red toilet water, but it eased. Went to spotting and is only having brown spotting today. No cramping reported today. She will try to ret and get off her feet when she can today and push fluids. Will call back if bleeding starts to intensify or pain worsens. We discussed that bleeding can sometimes occur after intercourse, exams, etc.     We discussed miscarriage vs. Other reasons for bleeding. We discussed with miscarriage that the bleeding will increase and cramping will increase. Call if any concerns.

## 2019-12-27 NOTE — PROGRESS NOTES
Administered flu vaccine per MD order. Patient consent signed by patient. Injection given IM in the left deltoid . Patient tolerated well, no complications, no side effects.

## 2019-12-30 ENCOUNTER — TELEPHONE (OUTPATIENT)
Dept: OBGYN CLINIC | Age: 24
End: 2019-12-30

## 2019-12-30 RX ORDER — PROMETHAZINE HYDROCHLORIDE 25 MG/1
TABLET ORAL
Qty: 30 TAB | Refills: 1 | Status: SHIPPED | OUTPATIENT
Start: 2019-12-30 | End: 2022-04-15

## 2019-12-30 NOTE — TELEPHONE ENCOUNTER
Message left at 9:30-am    [facility] patient    25year old patient  last seen in 19 and she is about  8 weeks pregnant. Patient left a message to say that needs something for nausea. This nurse called the patient back . Patient denies vaginal bleeding and cramping and since her last visit has had increasing problems with nausea. This nurse advised of taking vitamin b6 and unisom otc. Patient state she has tried saltine crackers. Patient wondering about getting a prescription for nausea. Pharmacy confirmed.     Please advise

## 2019-12-30 NOTE — TELEPHONE ENCOUNTER
This nurse attempted to reach the patient and left a detailed message for the patient to call the office back.

## 2019-12-30 NOTE — TELEPHONE ENCOUNTER
Patient left a message at 1:43PM    This nurse called the patient back and advised of work in [place],Dr. Carroll Cope, recommendations. Prescription sent as per [place] order to patient confirmed pharmacy. Patient advised to regarding medication may make her feel drousy. Patient verbalized understanding.

## 2020-01-02 ENCOUNTER — ROUTINE PRENATAL (OUTPATIENT)
Dept: OBGYN CLINIC | Age: 25
End: 2020-01-02

## 2020-01-02 ENCOUNTER — OFFICE VISIT (OUTPATIENT)
Dept: OBGYN CLINIC | Age: 25
End: 2020-01-02

## 2020-01-02 ENCOUNTER — TELEPHONE (OUTPATIENT)
Dept: OBGYN CLINIC | Age: 25
End: 2020-01-02

## 2020-01-02 VITALS
SYSTOLIC BLOOD PRESSURE: 100 MMHG | DIASTOLIC BLOOD PRESSURE: 60 MMHG | WEIGHT: 118 LBS | BODY MASS INDEX: 19.66 KG/M2 | HEIGHT: 65 IN

## 2020-01-02 DIAGNOSIS — Z67.90 BLOOD TYPE, RH POSITIVE: ICD-10-CM

## 2020-01-02 DIAGNOSIS — O41.8X90 SUBCHORIONIC HEMORRHAGE OF PLACENTA, ANTEPARTUM: ICD-10-CM

## 2020-01-02 DIAGNOSIS — O20.0 THREATENED ABORTION: Primary | ICD-10-CM

## 2020-01-02 DIAGNOSIS — O46.8X9 SUBCHORIONIC HEMORRHAGE OF PLACENTA, ANTEPARTUM: ICD-10-CM

## 2020-01-02 NOTE — PATIENT INSTRUCTIONS
Threatened Miscarriage: Care Instructions  Your Care Instructions    Some women have light spotting or bleeding during the first 12 weeks of pregnancy. In some cases this is normal. Light spotting or bleeding can also be a sign of a possible loss of the pregnancy. This is called a threatened miscarriage. At this point, the doctor may not be able to tell if your vaginal bleeding is normal or is a sign of a miscarriage. In early pregnancy, things such as stress, exercise, and sex do not cause miscarriage. You may be worried or upset about the possibility of losing your pregnancy. But do not blame yourself. There is no treatment to stop a threatened miscarriage. If you do have a miscarriage, there was nothing you could have done to prevent it. A miscarriage usually means that the pregnancy is not developing normally. The doctor has checked you carefully, but problems can develop later. If you notice any problems or new symptoms, get medical treatment right away. Follow-up care is a key part of your treatment and safety. Be sure to make and go to all appointments, and call your doctor if you are having problems. It's also a good idea to know your test results and keep a list of the medicines you take. How can you care for yourself at home? · If you do have a miscarriage, you will probably have some vaginal bleeding for 1 to 2 weeks. Use pads instead of tampons. · Take acetaminophen (Tylenol) for cramps. Read and follow all instructions on the label. · Do not take two or more pain medicines at the same time unless the doctor told you to. Many pain medicines have acetaminophen, which is Tylenol. Too much acetaminophen (Tylenol) can be harmful. · Do not have sex until your doctor says it is okay. · Get lots of rest over the next several days. · You may do your normal activities if you feel well enough to do them. But do not do any heavy exercise until your doctor says it is okay.   · Eat a balanced diet that is high in iron and vitamin C. Foods rich in iron include red meat, shellfish, eggs, beans, and leafy green vegetables. Foods high in vitamin C include citrus fruits, tomatoes, and broccoli. Talk to your doctor about whether you need to take iron pills or a multivitamin. · Do not drink alcohol or use tobacco or illegal drugs. · Do not smoke. If you need help quitting, talk to your doctor about stop-smoking programs and medicines. These can increase your chances of quitting for good. When should you call for help? Call 911 anytime you think you may need emergency care. For example, call if:    · You passed out (lost consciousness).    Call your doctor now or seek immediate medical care if:    · You have severe vaginal bleeding.     · You are dizzy or lightheaded, or you feel like you may faint.     · You have new or worse pain in your belly or pelvis.     · You have a fever.     · You have vaginal discharge that smells bad.    Watch closely for changes in your health, and be sure to contact your doctor if:    · You do not get better as expected. Where can you learn more? Go to http://jose de jesus-eulalio.info/. Enter D855 in the search box to learn more about \"Threatened Miscarriage: Care Instructions. \"  Current as of: May 29, 2019  Content Version: 12.2  © 7151-2953 [employer]. Care instructions adapted under license by [employer] (which disclaims liability or warranty for this information). If you have questions about a medical condition or this instruction, always ask your healthcare professional. [place] [address] any warranty or liability for your use of this information.

## 2020-01-02 NOTE — TELEPHONE ENCOUNTER
Call received at 8:40am     [facility] patient   25year old patient about 8 weeks on 12/20/19. Patient reports she had spotting on 12/31/19 and then at 3am on 1/1/2020 she had heavy bleeding dripping in the toilet and then passed a large fist size chunk of tissue. Patient reports cramping during this time. Patient reports she went through a pad after that quickly but since then she has only having spotting. Patient denies cramping currently but reports when she sneezes that she has sharp pains that go away. Patient denies feeling dizzy or faint. Patient given pain and bleeding precautions. Patient wondering if she needs to be seen. Patient is A+ blood type.     Please advise

## 2020-01-02 NOTE — TELEPHONE ENCOUNTER
Patient advised of work in [place], Dr. Hermann Schmitz recommendations and was placed on the schedule for ultrasound shirley 11:00am and then follow up shirley 11:20am( ok per TP)    Patient verbalized understanding.       TH advised of add on appointment

## 2020-01-02 NOTE — PROGRESS NOTES
[facility]  [email or web address]  [phone]    Zulma Preciado MD, FACOG       OB/GYN: Arun Later Problem visit    Chief Complaint:   Chief Complaint   Patient presents with    Ultrasound    Threatened Miscarriage       There are no active problems to display for this patient. History of Present Illness: The patient is a 25 y.o.  female who presents today for threatened miscarriage. She reports vaginal spotting that started on 2019 and picked up to heavy vaginal bleeding several hours later. She reports sharp pain with sneezing. This is a new problem. This is not a routinely scheduled OB appointment. She reports the symptoms are is unchanged. Aggravating factors include none. Alleviating factors include none. She does not have other concerns. PFSH:  Past Medical History:   Diagnosis Date    Acne     HPV vaccine counseling     Pt completed Gardasil series 10/2013     Past Surgical History:   Procedure Laterality Date    HX HEENT      Cyst removed      History reviewed. No pertinent family history. Social History     Tobacco Use    Smoking status: Never Smoker    Smokeless tobacco: Never Used   Substance Use Topics    Alcohol use: No    Drug use: Never     No Known Allergies  Current Outpatient Medications   Medication Sig    prenatal 00/IVRC fum/folic/dha (PRENATAL-1 PO) Take  by mouth.  promethazine (PHENERGAN) 25 mg tablet Take one po every 8 hours prn nausea     No current facility-administered medications for this visit.         Review of Systems:  History obtained from the patient and written ROS questionnaire  Constitutional: negative for fevers, chills and weight loss  ENT ROS: negative for - hearing change, oral lesions or visual changes  Respiratory: negative for cough, wheezing or dyspnea on exertion  Cardiovascular: negative for chest pain, irregular heart beats, exertional chest pressure/discomfort  Gastrointestinal: negative for dysphagia, nausea and vomiting  Genito-Urinary ROS: , see HPI  Inteument/breast: negative for rash, breast lump and nipple discharge  Musculoskeletal:negative for stiff joints, neck pain and muscle weakness  Endocrine ROS: negative for - breast changes, galactorrhea or temperature intolerance  Hematological and Lymphatic ROS: negative for - blood clots, bruising or swollen lymph nodes    Physical Exam:  Visit Vitals  /60   Ht 5' 5\" (1.651 m)   Wt 118 lb (53.5 kg)   BMI 19.64 kg/m²       GENERAL: alert, well appearing, and in no distress  HEAD; normocephalic, atraumatic  ABDOMEN: soft, nontender, nondistended, no masses or organomegaly   BACK: normal range of motion, no tenderness, no CVAT   EGBUS: no lesions, no inflammation, no masses  VULVA: normal appearing vulva with no masses, tenderness or lesions  VAGINA: normal appearing vagina with normal color, no lesions, brown discharge  CERVIX: normal appearing cervix without discharge or lesions, non tender, closed  UTERUS: uterus is enlarged in size, gravid appropriate for gestational age  ADNEXA: normal adnexa in size, nontender and no masses  NEURO: alert, oriented, normal speech    See PN flowsheet for additional notes and exam    Assessment:  25 y.o.  Unknown   Encounter Diagnoses   Name Primary?  Threatened  Yes    Blood type, Rh positive     Subchorionic hemorrhage of placenta, antepartum        Plan:  An evaluation of this patient's concern is planned. The patient is advised that she should contact the office if she does not note improvement or if symptoms recur  She should contact our office with any questions or concerns  She could keep her routine OB appointment. SAB precautions, close fu for viability   Rec pelvic rest avoid strenuous exercise until fu   Bleeding/anemia prec reviewed: notify MD for heavy bleeding/dzzy/light headed/ worsening pain/cramping    No orders of the defined types were placed in this encounter.       No results found for this visit on 01/02/20. Anselmo Stringer MD      Physician review of ultrasound performed by technician    Today's ultrasound report and images were reviewed and discussed with the patient.   Please see images and imaging report entered by technician in PACS for more detail and progress note and diagnosis entered by MD.    Olga Yoder MD

## 2020-01-02 NOTE — PATIENT INSTRUCTIONS
Threatened Miscarriage: Care Instructions  Your Care Instructions    Some women have light spotting or bleeding during the first 12 weeks of pregnancy. In some cases this is normal. Light spotting or bleeding can also be a sign of a possible loss of the pregnancy. This is called a threatened miscarriage. At this point, the doctor may not be able to tell if your vaginal bleeding is normal or is a sign of a miscarriage. In early pregnancy, things such as stress, exercise, and sex do not cause miscarriage. You may be worried or upset about the possibility of losing your pregnancy. But do not blame yourself. There is no treatment to stop a threatened miscarriage. If you do have a miscarriage, there was nothing you could have done to prevent it. A miscarriage usually means that the pregnancy is not developing normally. The doctor has checked you carefully, but problems can develop later. If you notice any problems or new symptoms, get medical treatment right away. Follow-up care is a key part of your treatment and safety. Be sure to make and go to all appointments, and call your doctor if you are having problems. It's also a good idea to know your test results and keep a list of the medicines you take. How can you care for yourself at home? · If you do have a miscarriage, you will probably have some vaginal bleeding for 1 to 2 weeks. Use pads instead of tampons. · Take acetaminophen (Tylenol) for cramps. Read and follow all instructions on the label. · Do not take two or more pain medicines at the same time unless the doctor told you to. Many pain medicines have acetaminophen, which is Tylenol. Too much acetaminophen (Tylenol) can be harmful. · Do not have sex until your doctor says it is okay. · Get lots of rest over the next several days. · You may do your normal activities if you feel well enough to do them. But do not do any heavy exercise until your doctor says it is okay.   · Eat a balanced diet that is high in iron and vitamin C. Foods rich in iron include red meat, shellfish, eggs, beans, and leafy green vegetables. Foods high in vitamin C include citrus fruits, tomatoes, and broccoli. Talk to your doctor about whether you need to take iron pills or a multivitamin. · Do not drink alcohol or use tobacco or illegal drugs. · Do not smoke. If you need help quitting, talk to your doctor about stop-smoking programs and medicines. These can increase your chances of quitting for good. When should you call for help? Call 911 anytime you think you may need emergency care. For example, call if:    · You passed out (lost consciousness).    Call your doctor now or seek immediate medical care if:    · You have severe vaginal bleeding.     · You are dizzy or lightheaded, or you feel like you may faint.     · You have new or worse pain in your belly or pelvis.     · You have a fever.     · You have vaginal discharge that smells bad.    Watch closely for changes in your health, and be sure to contact your doctor if:    · You do not get better as expected. Where can you learn more? Go to http://jose de jesus-eulalio.info/. Enter U580 in the search box to learn more about \"Threatened Miscarriage: Care Instructions. \"  Current as of: May 29, 2019  Content Version: 12.2  © 7247-2560 [employer]. Care instructions adapted under license by [employer] (which disclaims liability or warranty for this information). If you have questions about a medical condition or this instruction, always ask your healthcare professional. [place] [address] any warranty or liability for your use of this information.

## 2020-01-03 DIAGNOSIS — Z34.00 SUPERVISION OF NORMAL FIRST PREGNANCY, ANTEPARTUM: ICD-10-CM

## 2020-01-13 ENCOUNTER — ROUTINE PRENATAL (OUTPATIENT)
Dept: OBGYN CLINIC | Age: 25
End: 2020-01-13

## 2020-01-13 VITALS
HEART RATE: 88 BPM | WEIGHT: 118 LBS | RESPIRATION RATE: 19 BRPM | HEIGHT: 65 IN | DIASTOLIC BLOOD PRESSURE: 62 MMHG | BODY MASS INDEX: 19.66 KG/M2 | SYSTOLIC BLOOD PRESSURE: 130 MMHG

## 2020-01-13 DIAGNOSIS — O20.0 THREATENED ABORTION: Primary | ICD-10-CM

## 2020-01-13 DIAGNOSIS — Z3A.09 9 WEEKS GESTATION OF PREGNANCY: ICD-10-CM

## 2020-01-13 DIAGNOSIS — Z34.00 SUPERVISION OF NORMAL FIRST PREGNANCY, ANTEPARTUM: ICD-10-CM

## 2020-01-13 NOTE — PROGRESS NOTES
Threatened AB note    Joseph Abdul is a ,  25 y.o. female [facility] [place] Patient's last menstrual period was 2019 (approximate). making her 10 weeks pregnant. She has had prenatal care. By her initial [address] she is 9w     She presents for a follow up ultrasound that showed Comment  TA ULTRASOUND PERFORMED  A SINGLE VIABLE 9W1D IUP IS SEEN WITH NORMAL CARDIAC RHYTHM. THERE APPEARS TO BE A SMALL  SECOND GS SEEN WITH NO FETAL POLE. IT MEASURES 1.3 X.9CM. GESTATIONAL AGE BASED ON TODAYS ULTRASOUND. A NORMAL YOLK SAC IS SEEN. RIGHT OVARY APPEARS WITHIN NORMAL LIMITS. LEFT OVARY APPEARS WITHIN NORMAL LIMITS. NO FREE FLUID SEEN IN THE CDS. Lisa Dugan Her past medical history is not significant for risk factors for ectopic pregnancy. She has not had pelvic pain. The patient specifically denies right or left pelvic pain. She did pass a clot but her bleeding is mostly old blood. No pain or cramping                                                                                                                                                            She does not have a history of a spontaneous . She has not had a recent injury or trauma. Additional complaints: none. Past Medical History:   Diagnosis Date    Acne     HPV vaccine counseling     Pt completed Gardasil series 10/2013     Past Surgical History:   Procedure Laterality Date    HX HEENT      Cyst removed      Social History     Occupational History    Not on file   Tobacco Use    Smoking status: Never Smoker    Smokeless tobacco: Never Used   Substance and Sexual Activity    Alcohol use: No    Drug use: Never    Sexual activity: Yes     Partners: Male     Birth control/protection: None     History reviewed. No pertinent family history. No Known Allergies  Prior to Admission medications    Medication Sig Start Date End Date Taking? Authorizing Provider   prenatal 59/MUTS fum/folic/dha (PRENATAL-1 PO) Take  by mouth. Yes Provider, Historical   promethazine (PHENERGAN) 25 mg tablet Take one po every 8 hours prn nausea 12/30/19  Yes Kristina Leal MD        Review of Systems: History obtained from the patient  Constitutional: negative for weight loss, fever, night sweats  Breast: negative for breast lumps, nipple discharge, galactorrhea  GI: negative for change in bowel habits, abdominal pain, black or bloody stools  : negative for frequency, dysuria, hematuria, vaginal discharge  MSK: negative for back pain, joint pain, muscle pain  Skin: negative for itching, rash, hives  Psych: negative for anxiety, depression, change in mood      Objective:  Visit Vitals  /62 (BP 1 Location: Right arm, BP Patient Position: Sitting)   Pulse 88   Resp 19   Ht 5' 5\" (1.651 m)   Wt 118 lb (53.5 kg)   LMP 11/04/2019 (Approximate)   BMI 19.64 kg/m²       Physical Exam:   PHYSICAL EXAMINATION    Constitutional  · Appearance: well-nourished, well developed, alert, in no acute distress          Skin  · General Inspection: no rash, no lesions identified    Neurologic/Psychiatric  · Mental Status:  · Orientation: grossly oriented to person, place and time  · Mood and Affect: mood normal, affect appropriate    Assessment:   Threatened AB  EDC is 8/17/2019 by first US   pos    Plan:   RTO in 2 weeks   NIPTS in 2 weeks

## 2020-01-28 ENCOUNTER — ROUTINE PRENATAL (OUTPATIENT)
Dept: OBGYN CLINIC | Age: 25
End: 2020-01-28

## 2020-01-28 VITALS — DIASTOLIC BLOOD PRESSURE: 68 MMHG | WEIGHT: 119 LBS | BODY MASS INDEX: 19.8 KG/M2 | SYSTOLIC BLOOD PRESSURE: 111 MMHG

## 2020-01-28 DIAGNOSIS — Z34.00 SUPERVISION OF NORMAL FIRST PREGNANCY, ANTEPARTUM: Primary | ICD-10-CM

## 2020-01-28 NOTE — PROGRESS NOTES
Problem List  Date Reviewed: 1/13/2020          Codes Class Noted    Supervision of normal first pregnancy, antepartum ICD-10-CM: Z34.00  ICD-9-CM: V22.0  1/3/2020    Overview Addendum 1/22/2020  9:22 AM by Graham Bradley LPN     Intrauterine pregnancy with the following problems identified:   [facility] 8/17/2029 D=US.   Flu vaccine 12/23/2019  Horizon- Negative  NIPTS @ next visit

## 2020-01-28 NOTE — PATIENT INSTRUCTIONS
Weeks 10 to 14 of Your Pregnancy: Care Instructions  Your Care Instructions    By weeks 10 to 14 of your pregnancy, the placenta has formed inside your uterus. It is possible to hear your baby's heartbeat with a special ultrasound device. Your baby's eyes can and do move. The arms and legs can bend. This is a good time to think about testing for birth defects. There are two types of tests: screening and diagnostic. Screening tests show the chance that a baby has a certain birth defect. They can't tell you for sure that your baby has a problem. Diagnostic tests show if a baby has a certain birth defect. It's your choice whether to have these tests. You and your partner can talk to your doctor or midwife about birth defects tests. Follow-up care is a key part of your treatment and safety. Be sure to make and go to all appointments, and call your doctor if you are having problems. It's also a good idea to know your test results and keep a list of the medicines you take. How can you care for yourself at home? Decide about tests  · You can have screening tests and diagnostic tests to check for birth defects. The decision to have a test for birth defects is personal. Think about your age, your chance of passing on a family disease, your need to know about any problems, and what you might do after you have the test results. ? Triple or quadruple (quad) blood tests. These screening tests can be done between 15 and 20 weeks of pregnancy. They check the amounts of three or four substances in your blood. The doctor looks at these test results, along with your age and other factors, to find out the chance that your baby may have certain problems. ? Amniocentesis. This diagnostic test is used to look for chromosomal problems in the baby's cells.  It can be done between 15 and 20 weeks of pregnancy, usually around week 16.  ? Nuchal translucency test. This test uses ultrasound to measure the thickness of the area at the back of the baby's neck. An increase in the thickness can be an early sign of Down syndrome. ? Chorionic villus sampling (CVS). This is a test that looks for certain genetic problems with your baby. The same genes that are in your baby are in the placenta. A small piece of the placenta is taken out and tested. This test is done when you are 10 to 13 weeks pregnant. Ease discomfort  · Slow down and take naps when you feel tired. · If your emotions swing, talk to someone. Crying, anxiety, and concentration problems are common. · If your gums bleed, try a softer toothbrush. If your gums are puffy and bleed a lot, see your dentist.  · If you feel dizzy:  ? Get up slowly after sitting or lying down. ? Drink plenty of fluids. ? Eat small snacks to keep your blood sugar stable. ? Put your head between your legs as though you were tying your shoelaces. ? Lie down with your legs higher than your head. Use pillows to prop up your feet. · If you have a headache:  ? Lie down. ? Ask your partner or a good friend for a neck massage. ? Try cool cloths over your forehead or across the back of your neck. ? Use acetaminophen (Tylenol) for pain relief. Do not use nonsteroidal anti-inflammatory drugs (NSAIDs), such as ibuprofen (Advil, Motrin) or naproxen (Aleve), unless your doctor says it is okay. · If you have a nosebleed, pinch your nose gently, and hold it for a short while. To prevent nosebleeds, try massaging a small dab of petroleum jelly, such as Vaseline, in your nostrils. · If your nose is stuffed up, try saline (saltwater) nose sprays. Do not use decongestant sprays. Care for your breasts  · Wear a bra that gives you good support. · Know that changes in your breasts are normal.  ? Your breasts may get larger and more tender. Tenderness usually gets better by 12 weeks. ? Your nipples may get darker and larger, and small bumps around your nipples may show more. ?  The veins in your chest and breasts may show more. · Don't worry about \"toughening'\" your nipples. Breastfeeding will naturally do this. Where can you learn more? Go to http://jose de jesus-eulalio.info/. Enter S501 in the search box to learn more about \"Weeks 10 to 14 of Your Pregnancy: Care Instructions. \"  Current as of: May 29, 2019  Content Version: 12.2  © 9306-1073 [employer]. Care instructions adapted under license by [employer] (which disclaims liability or warranty for this information). If you have questions about a medical condition or this instruction, always ask your healthcare professional. [address] any warranty or liability for your use of this information.

## 2020-01-29 LAB
ABO GROUP BLD: NORMAL
BLD GP AB SCN SERPL QL: NEGATIVE
RH BLD: POSITIVE

## 2020-02-10 ENCOUNTER — TELEPHONE (OUTPATIENT)
Dept: OBGYN CLINIC | Age: 25
End: 2020-02-10

## 2020-02-10 NOTE — TELEPHONE ENCOUNTER
Message left at 9.39am      25year old G2Po 15 wod pregnant patient last seen in the office on 1/28/2020    Patient left a message about her panorama results. This nurse called the patient back and advised the the panorama results are still processing    Patient reports every thing is ok with the baby.

## 2020-02-17 ENCOUNTER — LAB ONLY (OUTPATIENT)
Dept: OBGYN CLINIC | Age: 25
End: 2020-02-17

## 2020-02-17 DIAGNOSIS — Z36.0 ENCOUNTER FOR ANTENATAL SCREENING FOR CHROMOSOMAL ANOMALIES: ICD-10-CM

## 2020-02-17 DIAGNOSIS — Z34.00 SUPERVISION OF NORMAL FIRST PREGNANCY, ANTEPARTUM: Primary | ICD-10-CM

## 2020-02-25 ENCOUNTER — ROUTINE PRENATAL (OUTPATIENT)
Dept: OBGYN CLINIC | Age: 25
End: 2020-02-25

## 2020-02-25 VITALS
HEIGHT: 65 IN | WEIGHT: 121 LBS | HEART RATE: 76 BPM | SYSTOLIC BLOOD PRESSURE: 102 MMHG | BODY MASS INDEX: 20.16 KG/M2 | DIASTOLIC BLOOD PRESSURE: 56 MMHG

## 2020-02-25 DIAGNOSIS — Z34.00 SUPERVISION OF NORMAL FIRST PREGNANCY, ANTEPARTUM: Primary | ICD-10-CM

## 2020-02-25 LAB — AFP, MATERNAL, EXTERNAL: POSITIVE

## 2020-02-25 NOTE — PATIENT INSTRUCTIONS

## 2020-02-25 NOTE — PROGRESS NOTES
Problem List  Date Reviewed: 1/28/2020          Codes Class Noted    Supervision of normal first pregnancy, antepartum ICD-10-CM: Z34.00  ICD-9-CM: V22.0  1/3/2020    Overview Addendum 1/22/2020  9:22 AM by Rocío Mays LPN     Intrauterine pregnancy with the following problems identified:   [facility] 8/17/2029 D=US.   Flu vaccine 12/23/2019  Horizon- Negative  NIPTS @ next visit

## 2020-03-02 LAB
AFP ADJ MOM SERPL: 3.64
AFP INTERP SERPL-IMP: ABNORMAL
AFP INTERP SERPL-IMP: ABNORMAL
AFP SERPL-MCNC: 123.7 NG/ML
AGE AT DELIVERY: 25.1 YR
COMMENT, 018013: ABNORMAL
GA METHOD: ABNORMAL
GA: 15.1 WEEKS
IDDM PATIENT QL: NO
MULTIPLE PREGNANCY: NO
NEURAL TUBE DEFECT RISK FETUS: 43 %
RESULTS, 017004: ABNORMAL

## 2020-03-03 DIAGNOSIS — Z34.00 SUPERVISION OF NORMAL FIRST PREGNANCY, ANTEPARTUM: ICD-10-CM

## 2020-03-03 DIAGNOSIS — O28.0 ABNORMAL ANTENATAL AFP SCREEN: Primary | ICD-10-CM

## 2020-03-03 NOTE — PROGRESS NOTES
Recorded labs, added to problem list. Left message for patient to call office in reference to lab results. AFP was positive. Referred to [facility] [place], faxed order with results. Patient is scheduled to see MFM on Friday 3/6/2020 at 8:00am here at [id number]

## 2020-03-03 NOTE — PROGRESS NOTES
Patient left a message at 2:48PPM about her positive lab results. This nurse called the patient back. This nurse went over MD reviewed lab and recommendations. Patient advised that she would have an ultrasound and then discuss all her options with genetic counselor. Patient verbalized understanding.

## 2020-03-06 ENCOUNTER — HOSPITAL ENCOUNTER (OUTPATIENT)
Dept: PERINATAL CARE | Age: 25
Discharge: HOME | End: 2020-03-06
Attending: OBSTETRICS & GYNECOLOGY
Payer: OTHER GOVERNMENT

## 2020-03-06 PROCEDURE — 76805 OB US >/= 14 WKS SNGL FETUS: CPT | Performed by: OBSTETRICS & GYNECOLOGY

## 2020-03-16 ENCOUNTER — TELEPHONE (OUTPATIENT)
Dept: OBGYN CLINIC | Age: 25
End: 2020-03-16

## 2020-03-16 NOTE — TELEPHONE ENCOUNTER
Patient and I discussed round ligament pain and treatment as she grows with maternity belt. Call prn.

## 2020-03-16 NOTE — TELEPHONE ENCOUNTER
Per Dr. Lawyer Wan sounds like round ligament pain. Continue to monitor. Tylenol and warm shower prn.

## 2020-03-16 NOTE — TELEPHONE ENCOUNTER
Patient of [address] [place] [address] Rd    18 w 0 d    Calling to say that she has had some cramping in her abdomen from day one of pregnancy but it would come and go. She said she went to lunch today and then the pain started in her left side and is very sharp and painful. She said it is better with sitting. She is walking around and she said it is a 8/10 pain. She is not constipated, she went 2 days ago which is her norm. She is drinking plenty of water.

## 2020-03-23 ENCOUNTER — TELEPHONE (OUTPATIENT)
Dept: OBGYN CLINIC | Age: 25
End: 2020-03-23

## 2020-03-23 NOTE — TELEPHONE ENCOUNTER
Call received at 540am    25year old  19w0d pregnant patient last seen in the office on 3/3/2020      Patient denies vaginal bleeding and reports mild cramping and has not yet felt the baby move. Patient reports feeling faint at work. Patient had her vital signs taken, /66 and then after resting 98/55 , pulse 98 and blood glucose 135. Patient had eaten breakfast and is drinking po fluids. Patient is resting and is feeling better. Patient denies temperature . Patient advised to increase po fluids, and to eat small frequent meals and to monitor her symptoms.       Additional recommendations     Please advise

## 2020-03-23 NOTE — TELEPHONE ENCOUNTER
Patient advised of MD recommendations and will monitor her symptoms    Patient verbalized understanding.

## 2020-03-31 ENCOUNTER — HOSPITAL ENCOUNTER (OUTPATIENT)
Dept: PERINATAL CARE | Age: 25
Discharge: HOME | End: 2020-03-31
Attending: OBSTETRICS & GYNECOLOGY
Payer: OTHER GOVERNMENT

## 2020-03-31 PROCEDURE — 76811 OB US DETAILED SNGL FETUS: CPT | Performed by: OBSTETRICS & GYNECOLOGY

## 2020-03-31 NOTE — PROGRESS NOTES
Add to problem list:  Leeanne Cruz at 28 weeks with glucola in our office  Weekly testing at [facility] 36-38 weeks

## 2020-04-01 NOTE — PROGRESS NOTES
Added to chart, problem list. She is scheduled virtual with  in 4 wks.  Flup with MFM and routine 28 weeks with glucola on 5/27

## 2020-04-02 ENCOUNTER — ROUTINE PRENATAL (OUTPATIENT)
Dept: OBGYN CLINIC | Age: 25
End: 2020-04-02

## 2020-04-02 ENCOUNTER — VIRTUAL VISIT (OUTPATIENT)
Dept: OBGYN CLINIC | Age: 25
End: 2020-04-02

## 2020-04-02 VITALS — BODY MASS INDEX: 20.14 KG/M2 | HEIGHT: 65 IN

## 2020-04-02 DIAGNOSIS — O09.90 SUPERVISION OF HIGH RISK PREGNANCY, ANTEPARTUM: Primary | ICD-10-CM

## 2020-04-02 DIAGNOSIS — O28.0 ABNORMAL MSAFP (MATERNAL SERUM ALPHA-FETOPROTEIN), ELEVATED: ICD-10-CM

## 2020-04-02 DIAGNOSIS — O28.0 ABNORMAL MSAFP (MATERNAL SERUM ALPHA-FETOPROTEIN), DECREASED: ICD-10-CM

## 2020-04-02 DIAGNOSIS — Z3A.20 20 WEEKS GESTATION OF PREGNANCY: ICD-10-CM

## 2020-04-02 NOTE — PROGRESS NOTES
[employer] visit    Parmjit Luis is a 25 y.o. female who was seen by synchronous (real-time) audio-video technology on 4/2/2020.       Baby moving  Saw MFM for elevated MSAFP - normal evaluation - thought related to early bleeding  She denies bleeding, leaking or contractions  No BM in a week - disc taking metamucil and miralax    TM visit in 4 weeks  US with glucola in 8 weeks in office

## 2020-04-02 NOTE — PROGRESS NOTES
[employer] Video visit  Clearance Gonzalo is a 25 y.o. female who was seen by synchronous (real-time) audio-video technology on 4/2/2020. Please see OB flowsheet for documentation. We discussed the expected course, resolution and complications of the diagnosis(es) in detail. Medication risks, benefits, costs, interactions, and alternatives were discussed as indicated. I advised her to contact the office if her condition worsens, changes or fails to improve as anticipated. She expressed understanding with the diagnosis(es) and plan. Pursuant to the emergency declaration under the [facility] waiver authority and the [employer] Act, this Virtual  Visit was conducted, with patient's consent, to reduce the patient's risk of exposure to COVID-19 and provide continuity of care for an established patient. Services were provided through a video synchronous discussion virtually to substitute for in-person clinic visit.

## 2020-04-23 ENCOUNTER — HOSPITAL ENCOUNTER (EMERGENCY)
Age: 25
Discharge: HOME | End: 2020-04-23
Attending: OBSTETRICS & GYNECOLOGY | Admitting: OBSTETRICS & GYNECOLOGY
Payer: OTHER GOVERNMENT

## 2020-04-23 VITALS
DIASTOLIC BLOOD PRESSURE: 65 MMHG | HEART RATE: 72 BPM | TEMPERATURE: 98.6 F | RESPIRATION RATE: 16 BRPM | OXYGEN SATURATION: 99 % | SYSTOLIC BLOOD PRESSURE: 104 MMHG

## 2020-04-23 LAB
APPEARANCE UR: CLEAR
BACTERIA URNS QL MICRO: NEGATIVE /HPF
BILIRUB UR QL: NEGATIVE
COLOR UR: NORMAL
EPITH CASTS URNS QL MICRO: NORMAL /LPF
GLUCOSE UR STRIP.AUTO-MCNC: NEGATIVE MG/DL
HGB UR QL STRIP: NEGATIVE
HYALINE CASTS URNS QL MICRO: NORMAL /LPF (ref 0–5)
KETONES UR QL STRIP.AUTO: NEGATIVE MG/DL
LEUKOCYTE ESTERASE UR QL STRIP.AUTO: NEGATIVE
NITRITE UR QL STRIP.AUTO: NEGATIVE
PH UR STRIP: 6.5 [PH] (ref 5–8)
PROT UR STRIP-MCNC: NEGATIVE MG/DL
RBC #/AREA URNS HPF: NORMAL /HPF (ref 0–5)
SP GR UR REFRACTOMETRY: <1.005 (ref 1–1.03)
UA: UC IF INDICATED,UAUC: NORMAL
UROBILINOGEN UR QL STRIP.AUTO: 0.2 EU/DL (ref 0.2–1)
WBC URNS QL MICRO: NORMAL /HPF (ref 0–4)

## 2020-04-23 PROCEDURE — 81001 URINALYSIS AUTO W/SCOPE: CPT

## 2020-04-23 PROCEDURE — 99282 EMERGENCY DEPT VISIT SF MDM: CPT

## 2020-04-23 NOTE — PROGRESS NOTES
1630: RN assumes care of pt at this time. 1634: FHT obtained. 160's.     1635: George Wheat MD at bedside. SVE performed. Pt closed, thick, and high. MD educates pt on difference between CTX and uterine growth. MD orders UA with culture, and fetal heart tones. 1805: RN reviewed discharge instructions with pt. Pt verbalizes understanding of instructions. Pt ambulatory off unit with belongings.

## 2020-04-23 NOTE — H&P
History & Physical    Name: Lamar Gupta MRN: [id number]  SSN: [id number]    YOB: 1995  Age: 25 y.o. Sex: female        Subjective:     Estimated Date of Delivery: 20  OB History        1    Para   0    Term   0       0    AB   0    Living   0       SAB   0    TAB   0    Ectopic   0    Molar        Multiple   0    Live Births   0                Ms. Deri Found is admitted with pregnancy at 23w3d for decreased fetal movement and cramping. . Prenatal course was complicated by abnormal NIPS. Please see prenatal records for details. Past Medical History:   Diagnosis Date    Acne     HPV vaccine counseling     Pt completed Gardasil series 10/2013     Past Surgical History:   Procedure Laterality Date    HX HEENT      Cyst removed      Social History     Occupational History    Not on file   Tobacco Use    Smoking status: Never Smoker    Smokeless tobacco: Never Used   Substance and Sexual Activity    Alcohol use: No    Drug use: Never    Sexual activity: Yes     Partners: Male     Birth control/protection: None     No family history on file. No Known Allergies  Prior to Admission medications    Medication Sig Start Date End Date Taking? Authorizing Provider   prenatal 53/TWHW fum/folic/dha (PRENATAL-1 PO) Take  by mouth. Provider, Historical   promethazine (PHENERGAN) 25 mg tablet Take one po every 8 hours prn nausea 19   Tushar Irvin MD        Review of Systems: A comprehensive review of systems was negative except for that written in the HPI. Objective:     Vitals: There were no vitals filed for this visit. Physical Exam:  Patient without distress.   Abdomen: soft, nontender  Fundus: soft and non tender  Cervical Exam: Closed/Thick/High  Membranes:  Intact  Fetal Heart Rate: Positive    Prenatal Labs:   Lab Results   Component Value Date/Time    Rubella, External Immune 2019    HBsAg, External Negative 2019    HIV, External non-reactive 12/23/2019    Gonorrhea, External Negative 12/23/2019    Chlamydia, External Negative 12/23/2019        Assessment/Plan:     Plan: Decreased fetal movement, normal FHT's, discussed movement may not be consistent until 26-28 weeks. Cramping, no evidence of PTL. Cervix long and closed. Will send urinalysis with reflex culture. D/C to home to f/u in the office prn.     Signed By:  Yennifer Andino MD     April 23, 2020

## 2020-04-23 NOTE — DISCHARGE INSTRUCTIONS
Patient Education   Patient Education        [facility] Contractions: Care Instructions  Your Care Instructions    [place] contractions prepare your uterus for labor. Think of them as a \"warm-up\" exercise that your body does. You may begin to feel them between the 28th and 30th weeks of your pregnancy. But they start as early as the 20th week. [place] contractions usually occur more often during the ninth month. They may go away when you are active and return when you rest. These contractions are like mild contractions of true labor, but they occur less often. (You feel fewer than 8 in an hour.) They don't cause your cervix to open. It may be hard for you to tell the difference between [facility] contractions and true labor, especially in your first pregnancy. Follow-up care is a key part of your treatment and safety. Be sure to make and go to all appointments, and call your doctor if you are having problems. It's also a good idea to know your test results and keep a list of the medicines you take. How can you care for yourself at home? · Try a warm bath to help relieve muscle tension and reduce pain. · Change positions every 30 minutes. Take breaks if you must sit for a long time. Get up and walk around. · Drink plenty of water, enough so that your urine is light yellow or clear like water. · Taking short walks may help you feel better. Your doctor needs to check any contractions that are getting stronger or closer together. Where can you learn more? Go to http://jose de jesus-eulalio.info/  Enter Z402 in the search box to learn more about \"[place] Contractions: Care Instructions. \"  Current as of: May 29, 2019Content Version: 12.4  © 2598-3082 [employer]. Care instructions adapted under license by [employer] (which disclaims liability or warranty for this information).  If you have questions about a medical condition or this instruction, always ask your healthcare professional. [place] [address] any warranty or liability for your use of this information. Weeks 22 to 26 of Your Pregnancy: Care Instructions  Your Care Instructions    As you enter your 7th month of pregnancy at week 26, your baby's lungs are growing stronger and getting ready to breathe. You may notice that your baby responds to the sound of your or your partner's voice. You may also notice that your baby does less turning and twisting and more squirming or jerking. Jerking often means that your baby has the hiccups. Hiccups are perfectly normal and are only temporary. You may want to think about attending a childbirth preparation class. This is also a good time to start thinking about whether you want to have pain medicine during labor. Most pregnant women are tested for gestational diabetes between weeks 25 and 28. Gestational diabetes occurs when your blood sugar level gets too high when you're pregnant. The test is important, because you can have gestational diabetes and not know it. But the condition can cause problems for your baby. Follow-up care is a key part of your treatment and safety. Be sure to make and go to all appointments, and call your doctor if you are having problems. It's also a good idea to know your test results and keep a list of the medicines you take. How can you care for yourself at home? Ease discomfort from your baby's kicking  · Change your position. Sometimes this will cause your baby to change position too. · Take a deep breath while you raise your arm over your head. Then breathe out while you drop your arm. Do Kegel exercises to prevent urine from leaking  · You can do Kegel exercises while you stand or sit. ? Squeeze the same muscles you would use to stop your urine. Your belly and thighs should not move. ? Hold the squeeze for 3 seconds, and then relax for 3 seconds. ? Start with 3 seconds.  Then add 1 second each week until you are able to squeeze for 10 seconds. ? Repeat the exercise 10 to 15 times for each session. Do three or more sessions each day. Ease or reduce swelling in your feet, ankles, hands, and fingers  · If your fingers are puffy, take off your rings. · Do not eat high-salt foods, such as potato chips. · Prop up your feet on a stool or couch as much as possible. Sleep with pillows under your feet. · Do not stand for long periods of time or wear tight shoes. · Wear support stockings. Where can you learn more? Go to http://jose de jesus-eulalio.info/  Enter G264 in the search box to learn more about \"Weeks 22 to 26 of Your Pregnancy: Care Instructions. \"  Current as of: May 29, 2019Content Version: 12.4  © 0611-8389 Healthwise, Incorporated. Care instructions adapted under license by [employer] (which disclaims liability or warranty for this information). If you have questions about a medical condition or this instruction, always ask your healthcare professional. [address] any warranty or liability for your use of this information.

## 2020-04-28 ENCOUNTER — ROUTINE PRENATAL (OUTPATIENT)
Dept: OBGYN CLINIC | Age: 25
End: 2020-04-28

## 2020-04-28 ENCOUNTER — VIRTUAL VISIT (OUTPATIENT)
Dept: OBGYN CLINIC | Age: 25
End: 2020-04-28

## 2020-04-28 VITALS — BODY MASS INDEX: 22.74 KG/M2 | HEIGHT: 65 IN | WEIGHT: 136.5 LBS

## 2020-04-28 VITALS — WEIGHT: 136.5 LBS | BODY MASS INDEX: 22.71 KG/M2

## 2020-04-28 DIAGNOSIS — Z3A.24 24 WEEKS GESTATION OF PREGNANCY: ICD-10-CM

## 2020-04-28 DIAGNOSIS — O09.90 SUPERVISION OF HIGH RISK PREGNANCY, ANTEPARTUM: Primary | ICD-10-CM

## 2020-04-28 DIAGNOSIS — O28.0 ABNORMAL MSAFP (MATERNAL SERUM ALPHA-FETOPROTEIN), ELEVATED: ICD-10-CM

## 2020-04-28 NOTE — PROGRESS NOTES
Problem List  Date Reviewed: 4/2/2020          Codes Class Noted    Supervision of normal first pregnancy, antepartum ICD-10-CM: Z34.00  ICD-9-CM: V22.0  1/3/2020    Overview Addendum 4/1/2020 10:16 AM by Regine Díaz LPN     Intrauterine pregnancy with the following problems identified:   [facility] 8/17/2029 D=US.   Flu vaccine 12/23/2019  Horizon- Negative  NIPTS- normal female- Pt notified  AFP- Positive- MFM on 3/6  Fu US at 28 weeks with glucola in our office 5/27  Weekly testing at [facility] 36-38 weeks

## 2020-04-28 NOTE — PROGRESS NOTES
[employer] visit    Miracle Mc is a 25 y.o. female who was seen by synchronous (real-time) audio-video technology on 4/28/2020.       Seen on L&D for decrease FM last week - FM much better now - feels all the time  Only occ lower abdomen tightening  Has questions about LE edema - advised normal  Disc weight gain  Fu in 4 weeks  Has MFM in 4 weeks then will need weekly monitoring 34-36 weeks per MFM

## 2020-04-28 NOTE — PATIENT INSTRUCTIONS
Weeks 22 to 26 of Your Pregnancy: Care Instructions Your Care Instructions As you enter your 7th month of pregnancy at week 26, your baby's lungs are growing stronger and getting ready to breathe. You may notice that your baby responds to the sound of your or your partner's voice. You may also notice that your baby does less turning and twisting and more squirming or jerking. Jerking often means that your baby has the hiccups. Hiccups are perfectly normal and are only temporary. You may want to think about attending a childbirth preparation class. This is also a good time to start thinking about whether you want to have pain medicine during labor. Most pregnant women are tested for gestational diabetes between weeks 25 and 28. Gestational diabetes occurs when your blood sugar level gets too high when you're pregnant. The test is important, because you can have gestational diabetes and not know it. But the condition can cause problems for your baby. Follow-up care is a key part of your treatment and safety. Be sure to make and go to all appointments, and call your doctor if you are having problems. It's also a good idea to know your test results and keep a list of the medicines you take. How can you care for yourself at home? Ease discomfort from your baby's kicking · Change your position. Sometimes this will cause your baby to change position too. · Take a deep breath while you raise your arm over your head. Then breathe out while you drop your arm. Do Kegel exercises to prevent urine from leaking · You can do Kegel exercises while you stand or sit. ? Squeeze the same muscles you would use to stop your urine. Your belly and thighs should not move. ? Hold the squeeze for 3 seconds, and then relax for 3 seconds. ? Start with 3 seconds. Then add 1 second each week until you are able to squeeze for 10 seconds. ? Repeat the exercise 10 to 15 times for each session.  Do three or more sessions each day. Ease or reduce swelling in your feet, ankles, hands, and fingers · If your fingers are puffy, take off your rings. · Do not eat high-salt foods, such as potato chips. · Prop up your feet on a stool or couch as much as possible. Sleep with pillows under your feet. · Do not stand for long periods of time or wear tight shoes. · Wear support stockings. Where can you learn more? Go to http://jose de jesus-eulalio.info/ Enter G264 in the search box to learn more about \"Weeks 22 to 26 of Your Pregnancy: Care Instructions. \" Current as of: May 29, 2019Content Version: 12.4 © 4115-2839 Healthwise, Incorporated. Care instructions adapted under license by [employer] (which disclaims liability or warranty for this information). If you have questions about a medical condition or this instruction, always ask your healthcare professional. [address] any warranty or liability for your use of this information.

## 2020-04-28 NOTE — PROGRESS NOTES
[employer] Video visit  Ludivina Carr is a 25 y.o. female who was seen by synchronous (real-time) audio-video technology on 4/28/2020. Please see OB flowsheet for documentation. We discussed the expected course, resolution and complications of the diagnosis(es) in detail. Medication risks, benefits, costs, interactions, and alternatives were discussed as indicated. I advised her to contact the office if her condition worsens, changes or fails to improve as anticipated. She expressed understanding with the diagnosis(es) and plan. Pursuant to the emergency declaration under the [facility], [facility] waiver authority and the [employer] Act, this Virtual  Visit was conducted, with patient's consent, to reduce the patient's risk of exposure to COVID-19 and provide continuity of care for an established patient. Services were provided through a video synchronous discussion virtually to substitute for in-person clinic visit.

## 2020-04-28 NOTE — PATIENT INSTRUCTIONS
Weeks 22 to 26 of Your Pregnancy: Care Instructions  Your Care Instructions    As you enter your 7th month of pregnancy at week 26, your baby's lungs are growing stronger and getting ready to breathe. You may notice that your baby responds to the sound of your or your partner's voice. You may also notice that your baby does less turning and twisting and more squirming or jerking. Jerking often means that your baby has the hiccups. Hiccups are perfectly normal and are only temporary. You may want to think about attending a childbirth preparation class. This is also a good time to start thinking about whether you want to have pain medicine during labor. Most pregnant women are tested for gestational diabetes between weeks 25 and 28. Gestational diabetes occurs when your blood sugar level gets too high when you're pregnant. The test is important, because you can have gestational diabetes and not know it. But the condition can cause problems for your baby. Follow-up care is a key part of your treatment and safety. Be sure to make and go to all appointments, and call your doctor if you are having problems. It's also a good idea to know your test results and keep a list of the medicines you take. How can you care for yourself at home? Ease discomfort from your baby's kicking  · Change your position. Sometimes this will cause your baby to change position too. · Take a deep breath while you raise your arm over your head. Then breathe out while you drop your arm. Do Kegel exercises to prevent urine from leaking  · You can do Kegel exercises while you stand or sit. ? Squeeze the same muscles you would use to stop your urine. Your belly and thighs should not move. ? Hold the squeeze for 3 seconds, and then relax for 3 seconds. ? Start with 3 seconds. Then add 1 second each week until you are able to squeeze for 10 seconds. ? Repeat the exercise 10 to 15 times for each session.  Do three or more sessions each day.  Ease or reduce swelling in your feet, ankles, hands, and fingers  · If your fingers are puffy, take off your rings. · Do not eat high-salt foods, such as potato chips. · Prop up your feet on a stool or couch as much as possible. Sleep with pillows under your feet. · Do not stand for long periods of time or wear tight shoes. · Wear support stockings. Where can you learn more? Go to http://jose de jesus-eulalio.info/  Enter G264 in the search box to learn more about \"Weeks 22 to 26 of Your Pregnancy: Care Instructions. \"  Current as of: May 29, 2019Content Version: 12.4  © 1094-0436 Healthwise, Incorporated. Care instructions adapted under license by [employer] (which disclaims liability or warranty for this information). If you have questions about a medical condition or this instruction, always ask your healthcare professional. [address] any warranty or liability for your use of this information.

## 2020-05-07 ENCOUNTER — TELEPHONE (OUTPATIENT)
Dept: OBGYN CLINIC | Age: 25
End: 2020-05-07

## 2020-05-07 NOTE — TELEPHONE ENCOUNTER
I spoke with patient and she said that it has eased up now and is not tight. Going home from work in a few and will lay down for a bit and take it easy. Encouraged PO fluids and to call again PRN. Pt agrees. Thanks!

## 2020-05-07 NOTE — TELEPHONE ENCOUNTER
She said she has moved around some and drank the water and the tightness only eased slightly.  But it is the whole belly that is tight. (she said you told her to watch for that)

## 2020-05-07 NOTE — TELEPHONE ENCOUNTER
Pt calls c/o constant cramp of entire belly for the last half hour. She has only been sitting and has had 6-8 cups of water today. Endorses good FM. No c/o bleeding or leaking. I informed pt to drink 2 glasses of water back to back and see if she can rest for a bit while I message Dr Satnam Harris for a response.

## 2020-05-23 LAB — GTT, 1 HR, GLUCOLA, EXTERNAL: 148

## 2020-05-27 ENCOUNTER — ROUTINE PRENATAL (OUTPATIENT)
Dept: OBGYN CLINIC | Age: 25
End: 2020-05-27

## 2020-05-27 ENCOUNTER — HOSPITAL ENCOUNTER (OUTPATIENT)
Dept: PERINATAL CARE | Age: 25
Discharge: HOME | End: 2020-05-27
Attending: OBSTETRICS & GYNECOLOGY
Payer: OTHER GOVERNMENT

## 2020-05-27 VITALS
HEIGHT: 65 IN | BODY MASS INDEX: 23.32 KG/M2 | WEIGHT: 140 LBS | DIASTOLIC BLOOD PRESSURE: 58 MMHG | SYSTOLIC BLOOD PRESSURE: 105 MMHG

## 2020-05-27 DIAGNOSIS — O28.0 ABNORMAL MSAFP (MATERNAL SERUM ALPHA-FETOPROTEIN), ELEVATED: ICD-10-CM

## 2020-05-27 DIAGNOSIS — Z3A.28 28 WEEKS GESTATION OF PREGNANCY: ICD-10-CM

## 2020-05-27 DIAGNOSIS — O09.90 SUPERVISION OF HIGH RISK PREGNANCY, ANTEPARTUM: Primary | ICD-10-CM

## 2020-05-27 DIAGNOSIS — Z23 ENCOUNTER FOR IMMUNIZATION: ICD-10-CM

## 2020-05-27 LAB
HCT, EXTERNAL: 30.5
HGB, EXTERNAL: 10
PLATELET CNT,   EXTERNAL: 236

## 2020-05-27 PROCEDURE — 76816 OB US FOLLOW-UP PER FETUS: CPT | Performed by: OBSTETRICS & GYNECOLOGY

## 2020-05-27 NOTE — PROGRESS NOTES
Saw MFM - will start monitoring at 34-36 weeks and has 4 week fu  Baby moving    See me in 2 weeks  Glucola/tdap today - Rh pos  Disc peds/consent/preregistration/classes

## 2020-05-27 NOTE — PROGRESS NOTES
Problem List  Date Reviewed: 4/28/2020          Codes Class Noted    Supervision of normal first pregnancy, antepartum ICD-10-CM: Z34.00  ICD-9-CM: V22.0  1/3/2020    Overview Addendum 4/1/2020 10:16 AM by Josefa Carcamo LPN     Intrauterine pregnancy with the following problems identified:   [facility] 8/17/2029 D=US. Flu vaccine 12/23/2019  Horizon- Negative  NIPTS- normal female- Pt notified  AFP- Positive- MFM on 3/6  Fu US at 28 weeks with glucola in our office 5/27  Weekly testing at [facility] 36-38 weeks                 Administered 0.5mL TETANUS, DIPHTHERIA TOXOIDS AND ACELLULAR PERTUSSIS VACCINE (TDAP) per Dr Jenni Valdes order. Patient consent signed by patient. Injection given IM in the Left Deltoid . Patient tolerated well, no complications, no side effects.     Lot # [id number]  Exp: 09/19/22

## 2020-05-27 NOTE — PATIENT INSTRUCTIONS

## 2020-05-28 LAB
ERYTHROCYTE [DISTWIDTH] IN BLOOD BY AUTOMATED COUNT: 11.3 % (ref 11.7–15.4)
GLUCOSE 1H P 50 G GLC PO SERPL-MCNC: 148 MG/DL (ref 65–139)
HCT VFR BLD AUTO: 30.5 % (ref 34–46.6)
HGB BLD-MCNC: 10 G/DL (ref 11.1–15.9)
MCH RBC QN AUTO: 30.6 PG (ref 26.6–33)
MCHC RBC AUTO-ENTMCNC: 32.8 G/DL (ref 31.5–35.7)
MCV RBC AUTO: 93 FL (ref 79–97)
PLATELET # BLD AUTO: 236 X10E3/UL (ref 150–450)
RBC # BLD AUTO: 3.27 X10E6/UL (ref 3.77–5.28)
WBC # BLD AUTO: 8.4 X10E3/UL (ref 3.4–10.8)

## 2020-06-01 DIAGNOSIS — Z34.00 SUPERVISION OF NORMAL FIRST PREGNANCY, ANTEPARTUM: ICD-10-CM

## 2020-06-02 ENCOUNTER — LAB ONLY (OUTPATIENT)
Dept: OBGYN CLINIC | Age: 25
End: 2020-06-02

## 2020-06-02 DIAGNOSIS — R73.09 ABNORMAL GLUCOSE: Primary | ICD-10-CM

## 2020-06-03 LAB
GLUCOSE 1H P 100 G GLC PO SERPL-MCNC: 179 MG/DL (ref 65–179)
GLUCOSE 2H P 100 G GLC PO SERPL-MCNC: 197 MG/DL (ref 65–154)
GLUCOSE 3H P 100 G GLC PO SERPL-MCNC: 125 MG/DL (ref 65–139)
GLUCOSE P FAST SERPL-MCNC: 71 MG/DL (ref 65–94)
NOTE:, 102047: ABNORMAL

## 2020-06-08 ENCOUNTER — ROUTINE PRENATAL (OUTPATIENT)
Dept: OBGYN CLINIC | Age: 25
End: 2020-06-08

## 2020-06-08 VITALS — BODY MASS INDEX: 24.3 KG/M2 | DIASTOLIC BLOOD PRESSURE: 66 MMHG | SYSTOLIC BLOOD PRESSURE: 113 MMHG | WEIGHT: 146 LBS

## 2020-06-08 DIAGNOSIS — Z3A.30 30 WEEKS GESTATION OF PREGNANCY: ICD-10-CM

## 2020-06-08 DIAGNOSIS — O09.90 SUPERVISION OF HIGH RISK PREGNANCY, ANTEPARTUM: Primary | ICD-10-CM

## 2020-06-08 DIAGNOSIS — O28.0 ABNORMAL MSAFP (MATERNAL SERUM ALPHA-FETOPROTEIN), ELEVATED: ICD-10-CM

## 2020-06-08 DIAGNOSIS — Z34.00 SUPERVISION OF NORMAL FIRST PREGNANCY, ANTEPARTUM: ICD-10-CM

## 2020-06-08 NOTE — PROGRESS NOTES
3hr GTT - one abnormal, one value right at borderline normal    Baby moving  Sees M 6/24 - she will discuss results with them - she \"technically\" passed    Fu in 2 weeks

## 2020-06-22 ENCOUNTER — ROUTINE PRENATAL (OUTPATIENT)
Dept: OBGYN CLINIC | Age: 25
End: 2020-06-22

## 2020-06-22 VITALS
DIASTOLIC BLOOD PRESSURE: 50 MMHG | SYSTOLIC BLOOD PRESSURE: 100 MMHG | WEIGHT: 146 LBS | BODY MASS INDEX: 24.32 KG/M2 | HEIGHT: 65 IN

## 2020-06-22 DIAGNOSIS — O28.0 ABNORMAL MSAFP (MATERNAL SERUM ALPHA-FETOPROTEIN), ELEVATED: ICD-10-CM

## 2020-06-22 DIAGNOSIS — O09.90 SUPERVISION OF HIGH RISK PREGNANCY, ANTEPARTUM: Primary | ICD-10-CM

## 2020-06-22 DIAGNOSIS — Z3A.32 32 WEEKS GESTATION OF PREGNANCY: ICD-10-CM

## 2020-06-22 NOTE — PATIENT INSTRUCTIONS

## 2020-06-22 NOTE — PROGRESS NOTES
Baby moving, doing well  Sees MFM later this week    She will discuss 3hr GTT - one abnormal, one borderline - per Dr. Judge Yao this should not meet criteria for abnormal (prior conversation)

## 2020-06-22 NOTE — PROGRESS NOTES
Problem List  Date Reviewed: 6/8/2020          Codes Class Noted    Supervision of normal first pregnancy, antepartum ICD-10-CM: Z34.00  ICD-9-CM: V22.0  1/3/2020    Overview Addendum 6/8/2020  4:03 PM by Pop Gordon MD     Intrauterine pregnancy with the following problems identified:   [facility] 8/17/2029 D=US.   Flu vaccine 12/23/2019  Horizon- Negative  NIPTS- normal female- Pt notified  AFP- Positive- MFM on 3/6  Fu US at 28 weeks with glucola in our office 5/27  Weekly testing at [facility] 36-38 weeks  Anemia 10.0  abnl glucola - 3 hr one abnormal, one at border of normal

## 2020-06-24 ENCOUNTER — HOSPITAL ENCOUNTER (OUTPATIENT)
Dept: PERINATAL CARE | Age: 25
Discharge: HOME | End: 2020-06-24
Attending: OBSTETRICS & GYNECOLOGY
Payer: OTHER GOVERNMENT

## 2020-06-24 PROCEDURE — 76816 OB US FOLLOW-UP PER FETUS: CPT | Performed by: OBSTETRICS & GYNECOLOGY

## 2020-07-07 ENCOUNTER — ROUTINE PRENATAL (OUTPATIENT)
Dept: OBGYN CLINIC | Age: 25
End: 2020-07-07

## 2020-07-07 VITALS — WEIGHT: 150 LBS | BODY MASS INDEX: 24.96 KG/M2 | SYSTOLIC BLOOD PRESSURE: 113 MMHG | DIASTOLIC BLOOD PRESSURE: 68 MMHG

## 2020-07-07 DIAGNOSIS — O28.0 ABNORMAL MSAFP (MATERNAL SERUM ALPHA-FETOPROTEIN), ELEVATED: ICD-10-CM

## 2020-07-07 DIAGNOSIS — O09.90 SUPERVISION OF HIGH RISK PREGNANCY, ANTEPARTUM: Primary | ICD-10-CM

## 2020-07-07 DIAGNOSIS — Z3A.34 34 WEEKS GESTATION OF PREGNANCY: ICD-10-CM

## 2020-07-07 NOTE — PROGRESS NOTES
Problem List  Date Reviewed: 6/22/2020          Codes Class Noted    Supervision of normal first pregnancy, antepartum ICD-10-CM: Z34.00  ICD-9-CM: V22.0  1/3/2020    Overview Addendum 6/8/2020  4:03 PM by Chetna Chatterjee MD     Intrauterine pregnancy with the following problems identified:   [address] 8/17/2029 D=US.   Flu vaccine 12/23/2019  Horizon- Negative  NIPTS- normal female- Pt notified  AFP- Positive- MFM on 3/6  Fu US at 28 weeks with glucola in our office 5/27  Weekly testing at M 36-38 weeks  Anemia 10.0  abnl glucola - 3 hr one abnormal, one at border of normal

## 2020-07-09 ENCOUNTER — HOSPITAL ENCOUNTER (OUTPATIENT)
Age: 25
Setting detail: OBSERVATION
Discharge: HOME | End: 2020-07-10
Attending: EMERGENCY MEDICINE | Admitting: OBSTETRICS & GYNECOLOGY
Payer: OTHER GOVERNMENT
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
## 2020-07-09 DIAGNOSIS — Z34.03 ENCOUNTER FOR SUPERVISION OF NORMAL FIRST PREGNANCY IN THIRD TRIMESTER: ICD-10-CM

## 2020-07-09 PROCEDURE — 99284 EMERGENCY DEPT VISIT MOD MDM: CPT

## 2020-07-09 PROCEDURE — 74011250637 HC RX REV CODE- 250/637: Performed by: OBSTETRICS & GYNECOLOGY

## 2020-07-09 RX ORDER — ZOLPIDEM TARTRATE 5 MG/1
5 TABLET ORAL
Status: DISCONTINUED | OUTPATIENT
Start: 2020-07-09 | End: 2020-07-10 | Stop reason: HOSPADM

## 2020-07-09 RX ORDER — HYDROCODONE BITARTRATE AND ACETAMINOPHEN 5; 325 MG/1; MG/1
2 TABLET ORAL ONCE
Status: COMPLETED | OUTPATIENT
Start: 2020-07-09 | End: 2020-07-09

## 2020-07-09 RX ORDER — LANOLIN ALCOHOL/MO/W.PET/CERES
325 CREAM (GRAM) TOPICAL
COMMUNITY
End: 2022-04-15

## 2020-07-09 RX ADMIN — HYDROCODONE BITARTRATE AND ACETAMINOPHEN 2 TABLET: 5; 325 TABLET ORAL at 20:57
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
## 2020-07-09 NOTE — LETTER
Valentino Llano 
[facility] [address] [facility] [address] 
[phone] Work/School Note Date: 7/9/2020 To Whom It May concern: 
 
 
Lazarus Felix was seen and treated from 7/9/2020 to 7/10/2020 at 7 PM in the Labor and Delivery Unit by the following provider(s): 
Attending Provider: Fariha Gil MD 
Resident: Venkatesh Brown MD. Lazarus Felix is excused from work/school on 07/10/20. She is clear to return to work/school on 07/11/20. Sincerely, Abhijeet Olguin RN

## 2020-07-09 NOTE — ED NOTES
TRANSFER - OUT REPORT:    Verbal report given to Suman on [employer]  being transferred to L&D) for routine progression of care       Report consisted of patients Situation, Background, Assessment and   Recommendations(SBAR). Information from the following report(s) SBAR was reviewed with the receiving nurse. Lines:       Opportunity for questions and clarification was provided.

## 2020-07-10 VITALS
SYSTOLIC BLOOD PRESSURE: 93 MMHG | OXYGEN SATURATION: 99 % | BODY MASS INDEX: 48.82 KG/M2 | TEMPERATURE: 99 F | DIASTOLIC BLOOD PRESSURE: 45 MMHG | WEIGHT: 293 LBS | HEART RATE: 74 BPM | HEIGHT: 65 IN | RESPIRATION RATE: 16 BRPM
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
## 2020-07-10 PROCEDURE — 74011250637 HC RX REV CODE- 250/637: Performed by: OBSTETRICS & GYNECOLOGY

## 2020-07-10 PROCEDURE — 99218 HC RM OBSERVATION: CPT

## 2020-07-10 RX ORDER — CYCLOBENZAPRINE HCL 10 MG
10 TABLET ORAL
Status: DISCONTINUED | OUTPATIENT
Start: 2020-07-10 | End: 2020-07-10 | Stop reason: HOSPADM

## 2020-07-10 RX ADMIN — CYCLOBENZAPRINE 10 MG: 10 TABLET, FILM COATED ORAL at 10:09
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
## 2020-07-10 NOTE — DISCHARGE INSTRUCTIONS
Patient Education   Patient Education   Follow up with Dr. Gilda Inman in one week. Call MD on call if contractions become painful and are 5 minutes apart for at least an hour or if you suspect that your water has broken. Some spotting is normal because your cervix was checked today. You may not notice it until tomorrow or not at all. Call MD for vaginal bleeding that is more than spotting. Weeks 34 to 36 of Your Pregnancy: Care Instructions  Your Care Instructions    By now, your baby and your belly have grown quite large. It is almost time to give birth. Your baby's lungs are almost ready to breathe air. The bones in your baby's head are now firm enough to protect it, but soft enough to move down through the birth canal.  You may feel excited, happy, anxious, or scared. You may wonder how you will know if you are in labor or what to expect during labor. Try to be flexible in your expectations of the birth. Because each birth is different, there is no way to know exactly what childbirth will be like for you. This care sheet will help you know what to expect and how to prepare. This may make your childbirth easier. If you haven't already had the Tdap shot during this pregnancy, talk to your doctor about getting it. It will help protect your  against pertussis infection. In the 36th week, most women have a test for group B streptococcus (GBS). GBS is a common bacteria that can live in the vagina and rectum. It can make your baby sick after birth. If you test positive, you will get antibiotics during labor. The medicine will keep your baby from getting the bacteria. Follow-up care is a key part of your treatment and safety. Be sure to make and go to all appointments, and call your doctor if you are having problems. It's also a good idea to know your test results and keep a list of the medicines you take. How can you care for yourself at home?   Learn about pain relief choices  · Pain is different for every woman. Talk with your doctor about your feelings about pain. · You can choose from several types of pain relief. These include medicine or breathing techniques, as well as comfort measures. You can use more than one option. · If you choose to have pain medicine during labor, talk to your doctor about your options. Some medicines lower anxiety and help with some of the pain. Others make your lower body numb so that you won't feel pain. · Be sure to tell your doctor about your pain medicine choice before you start labor or very early in your labor. You may be able to change your mind as labor progresses. · Rarely, a woman is put to sleep by medicine given through a mask or an IV. Labor and delivery  · The first stage of labor has three parts: early, active, and transition. ? Most women have early labor at home. You can stay busy or rest, eat light snacks, drink clear fluids, and start counting contractions. ? When talking during a contraction gets hard, you may be moving to active labor. During active labor, you should head for the hospital if you are not there already. ? You are in active labor when contractions come every 3 to 4 minutes and last about 60 seconds. Your cervix is opening more rapidly. ? If your water breaks, contractions will come faster and stronger. ? During transition, your cervix is stretching, and contractions are coming more rapidly. ? You may want to push, but your cervix might not be ready. Your doctor will tell you when to push. · The second stage starts when your cervix is completely opened and you are ready to push. ? Contractions are very strong to push the baby down the birth canal.  ? You will feel the urge to push. You may feel like you need to have a bowel movement. ? You may be coached to push with contractions. These contractions will be very strong, but you will not have them as often. You can get a little rest between contractions.   ? You may be emotional and irritable. You may not be aware of what is going on around you.  ? One last push, and your baby is born. · The third stage is when a few more contractions push out the placenta. This may take 30 minutes or less. · The fourth stage is the welcome recovery. You may feel overwhelmed with emotions and exhausted but alert. This is a good time to start breastfeeding. Where can you learn more? Go to http://jose de jesus-eulalio.info/  Enter B912 in the search box to learn more about \"Weeks 34 to 36 of Your Pregnancy: Care Instructions. \"  Current as of: May 29, 2019Content Version: 12.4  © 4834-2369 [employer]. Care instructions adapted under license by [employer] (which disclaims liability or warranty for this information). If you have questions about a medical condition or this instruction, always ask your healthcare professional. [place] [address] any warranty or liability for your use of this information. Counting Your Baby's Kicks: Care Instructions  Your Care Instructions     Counting your baby's kicks is one way your doctor can tell that your baby is healthy. Most women--especially in a first pregnancy--feel their baby move for the first time between 16 and 22 weeks. The movement may feel like flutters rather than kicks. Your baby may move more at certain times of the day. When you are active, you may notice less kicking than when you are resting. At your prenatal visits, your doctor will ask whether the baby is active. In your last trimester, your doctor may ask you to count the number of times you feel your baby move. Follow-up care is a key part of your treatment and safety. Be sure to make and go to all appointments, and call your doctor if you are having problems. It's also a good idea to know your test results and keep a list of the medicines you take. How do you count fetal kicks?   · A common method of checking your baby's movement is to count the number of kicks or moves you feel in 1 hour. Ten movements (such as kicks, flutters, or rolls) in 1 hour are normal. Some doctors suggest that you count in the morning until you get to 10 movements. Then you can quit for that day and start again the next day. · Pick your baby's most active time of day to count. This may be any time from morning to evening. · If you do not feel 10 movements in an hour, your baby may be sleeping. Wait for the next hour and count again. When should you call for help? Call your doctor now or seek immediate medical care if:  · You noticed that your baby has stopped moving or is moving much less than normal.  Watch closely for changes in your health, and be sure to contact your doctor if you have any problems. Where can you learn more? Go to http://www.gray.com/  Enter [id number] in the search box to learn more about \"Counting Your Baby's Kicks: Care Instructions. \"  Current as of: February 11, 2020               Content Version: 12.5  © 0447-8532 [employer]. Care instructions adapted under license by [employer] (which disclaims liability or warranty for this information). If you have questions about a medical condition or this instruction, always ask your healthcare professional. [address] any warranty or liability for your use of this information.
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
## 2020-07-14 ENCOUNTER — ROUTINE PRENATAL (OUTPATIENT)
Dept: OBGYN CLINIC | Age: 25
End: 2020-07-14

## 2020-07-14 VITALS — BODY MASS INDEX: 24.63 KG/M2 | DIASTOLIC BLOOD PRESSURE: 68 MMHG | WEIGHT: 148 LBS | SYSTOLIC BLOOD PRESSURE: 107 MMHG

## 2020-07-14 DIAGNOSIS — O28.0 ABNORMAL MSAFP (MATERNAL SERUM ALPHA-FETOPROTEIN), ELEVATED: ICD-10-CM

## 2020-07-14 DIAGNOSIS — O09.90 SUPERVISION OF HIGH RISK PREGNANCY, ANTEPARTUM: Primary | ICD-10-CM

## 2020-07-14 DIAGNOSIS — Z3A.35 35 WEEKS GESTATION OF PREGNANCY: ICD-10-CM
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
## 2020-07-14 NOTE — PATIENT INSTRUCTIONS
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
## 2020-07-24 ENCOUNTER — ROUTINE PRENATAL (OUTPATIENT)
Dept: OBGYN CLINIC | Age: 25
End: 2020-07-24

## 2020-07-24 ENCOUNTER — HOSPITAL ENCOUNTER (OUTPATIENT)
Dept: PERINATAL CARE | Age: 25
Discharge: HOME | End: 2020-07-24
Attending: OBSTETRICS & GYNECOLOGY
Payer: OTHER GOVERNMENT

## 2020-07-24 VITALS — WEIGHT: 152 LBS | BODY MASS INDEX: 25.29 KG/M2 | SYSTOLIC BLOOD PRESSURE: 119 MMHG | DIASTOLIC BLOOD PRESSURE: 76 MMHG

## 2020-07-24 DIAGNOSIS — O28.0 ABNORMAL MSAFP (MATERNAL SERUM ALPHA-FETOPROTEIN), ELEVATED: ICD-10-CM

## 2020-07-24 DIAGNOSIS — Z3A.36 36 WEEKS GESTATION OF PREGNANCY: ICD-10-CM

## 2020-07-24 DIAGNOSIS — O09.90 SUPERVISION OF HIGH RISK PREGNANCY, ANTEPARTUM: Primary | ICD-10-CM

## 2020-07-24 LAB — GRBS, EXTERNAL: POSITIVE

## 2020-07-24 PROCEDURE — 76819 FETAL BIOPHYS PROFIL W/O NST: CPT | Performed by: OBSTETRICS & GYNECOLOGY

## 2020-07-24 PROCEDURE — 76816 OB US FOLLOW-UP PER FETUS: CPT | Performed by: OBSTETRICS & GYNECOLOGY

## 2020-07-24 NOTE — PROGRESS NOTES
Saw MFM today - ANAIS = 27, breech, head measurements all >95%    GBS today  Desires to proceed with ECV - posted for 7/27  NPO after midnight  Disc risks/benefits of ECV

## 2020-07-26 LAB — GP B STREP DNA SPEC QL NAA+PROBE: POSITIVE

## 2020-07-27 ENCOUNTER — OFFICE VISIT (OUTPATIENT)
Dept: OBGYN CLINIC | Age: 25
End: 2020-07-27

## 2020-07-27 ENCOUNTER — HOSPITAL ENCOUNTER (EMERGENCY)
Age: 25
Discharge: HOME | End: 2020-07-27
Attending: OBSTETRICS & GYNECOLOGY | Admitting: OBSTETRICS & GYNECOLOGY
Payer: OTHER GOVERNMENT

## 2020-07-27 VITALS
RESPIRATION RATE: 16 BRPM | TEMPERATURE: 98.1 F | HEART RATE: 75 BPM | BODY MASS INDEX: 25.33 KG/M2 | OXYGEN SATURATION: 100 % | HEIGHT: 65 IN | SYSTOLIC BLOOD PRESSURE: 112 MMHG | DIASTOLIC BLOOD PRESSURE: 64 MMHG | WEIGHT: 152 LBS

## 2020-07-27 LAB
BASOPHILS # BLD: 0.1 K/UL (ref 0–0.1)
BASOPHILS NFR BLD: 1 % (ref 0–1)
DIFFERENTIAL METHOD BLD: ABNORMAL
EOSINOPHIL # BLD: 0.2 K/UL (ref 0–0.4)
EOSINOPHIL NFR BLD: 2 % (ref 0–7)
ERYTHROCYTE [DISTWIDTH] IN BLOOD BY AUTOMATED COUNT: 13.9 % (ref 11.5–14.5)
HCT VFR BLD AUTO: 34.6 % (ref 35–47)
HGB BLD-MCNC: 11.4 G/DL (ref 11.5–16)
IMM GRANULOCYTES # BLD AUTO: 0.1 K/UL (ref 0–0.04)
IMM GRANULOCYTES NFR BLD AUTO: 1 % (ref 0–0.5)
LYMPHOCYTES # BLD: 1.9 K/UL (ref 0.8–3.5)
LYMPHOCYTES NFR BLD: 21 % (ref 12–49)
MCH RBC QN AUTO: 31.6 PG (ref 26–34)
MCHC RBC AUTO-ENTMCNC: 32.9 G/DL (ref 30–36.5)
MCV RBC AUTO: 95.8 FL (ref 80–99)
MONOCYTES # BLD: 0.6 K/UL (ref 0–1)
MONOCYTES NFR BLD: 7 % (ref 5–13)
NEUTS SEG # BLD: 6.1 K/UL (ref 1.8–8)
NEUTS SEG NFR BLD: 68 % (ref 32–75)
NRBC # BLD: 0 K/UL (ref 0–0.01)
NRBC BLD-RTO: 0 PER 100 WBC
PLATELET # BLD AUTO: 210 K/UL (ref 150–400)
PMV BLD AUTO: 9.9 FL (ref 8.9–12.9)
RBC # BLD AUTO: 3.61 M/UL (ref 3.8–5.2)
WBC # BLD AUTO: 9 K/UL (ref 3.6–11)

## 2020-07-27 PROCEDURE — 76815 OB US LIMITED FETUS(S): CPT

## 2020-07-27 PROCEDURE — 96361 HYDRATE IV INFUSION ADD-ON: CPT

## 2020-07-27 PROCEDURE — 85025 COMPLETE CBC W/AUTO DIFF WBC: CPT

## 2020-07-27 PROCEDURE — 59025 FETAL NON-STRESS TEST: CPT

## 2020-07-27 PROCEDURE — 96372 THER/PROPH/DIAG INJ SC/IM: CPT

## 2020-07-27 PROCEDURE — 96360 HYDRATION IV INFUSION INIT: CPT

## 2020-07-27 PROCEDURE — 36415 COLL VENOUS BLD VENIPUNCTURE: CPT

## 2020-07-27 PROCEDURE — 74011250636 HC RX REV CODE- 250/636: Performed by: OBSTETRICS & GYNECOLOGY

## 2020-07-27 PROCEDURE — 59412 ANTEPARTUM MANIPULATION: CPT

## 2020-07-27 RX ORDER — TERBUTALINE SULFATE 1 MG/ML
0.25 INJECTION SUBCUTANEOUS
Status: COMPLETED | OUTPATIENT
Start: 2020-07-27 | End: 2020-07-27

## 2020-07-27 RX ORDER — SODIUM CHLORIDE, SODIUM LACTATE, POTASSIUM CHLORIDE, CALCIUM CHLORIDE 600; 310; 30; 20 MG/100ML; MG/100ML; MG/100ML; MG/100ML
125 INJECTION, SOLUTION INTRAVENOUS CONTINUOUS
Status: DISCONTINUED | OUTPATIENT
Start: 2020-07-27 | End: 2020-07-27 | Stop reason: HOSPADM

## 2020-07-27 RX ADMIN — TERBUTALINE SULFATE 0.25 MG: 1 INJECTION, SOLUTION SUBCUTANEOUS at 11:03

## 2020-07-27 RX ADMIN — SODIUM CHLORIDE, SODIUM LACTATE, POTASSIUM CHLORIDE, AND CALCIUM CHLORIDE 125 ML/HR: 600; 310; 30; 20 INJECTION, SOLUTION INTRAVENOUS at 10:20

## 2020-07-27 NOTE — PROGRESS NOTES
1010: Patient admitted to labor and delivery for scheduled external version. Dora Conroy, RN placing pt on EFM and L. Shauna Minus to place IV. POC discussed with pt, pt verbalizing understanding and agreement. Pt changed into gown. 1101: Dr. Johnie Campbell updated on pt status and MD PAULA give 0.25mg terbutaline at this time sub q for version. MD on her way to bedside    1110: Dr. Johnie Campbell and Dr. Trixie Randall at pt bedside at this time discussing POC. Artis performing ultrasound and confirming breech position still    1113: Dr. Johnie Campbell and Dr. Trixie Randall starting version at this time    : MDs stopping version due to pt pain expressed with procedure and baby not leaving breech position. Pt pain stopping when version stopped. 1116: Dr. Jeannie Perez pt may have regular diet at this time and be discharged after [facility] for 1 hour. MD discussing POC with pt,pt verbalizing agreement - pt already has scheduled c/s for 39 weeks. 1117: MD leaving pt bedside    1153: This RN updating MD on pt status, EFM, and contraction pattern. MD stating she is aware of contraction pattern and no concerns at this time. MD Yamile Busby this RN to recheck pt cervix prior to discharge, pt was 2cm/50% when MD checked at last appt. Pt may be discharged if no change    1155: This RN updating pt on POC, pt verbalizing agreement. Pt stating that she did not order lunch because she wants to each once she is discharged. Pt encouraged to drink water or juice since pt has not had anything by mouth since yesterday evening. Pt verbalizing understanding    1215: SVE per this RN, unchanged cervix noted 2cm/50%    1216: This RN remaining at pt bedside answering all pt questions regarding scheduled c/s and delivery    1238: IV removed. This RN reviewing discharge instructions, education, and precautions with pt at this time. Pt verbalizing understanding to all teachings and precautions. Pt aware of scheduled appt with Dr. Johnie Campbell this Friday 7/31.  Pt denying any pain, vaginal bleeding, or leaking of vaginal fluid. Pt confirms feeling fetal movement.     1240: Pt discharged at this time - to ambulate off unit with SO

## 2020-07-27 NOTE — DISCHARGE INSTRUCTIONS
Patient Education   Patient Education   Patient Education        Turning a Breech Baby: Care Instructions  Your Care Instructions     At the end of a pregnancy, most babies have their head near the birth canal (vagina). But sometimes a baby's rear end or feet are near the birth canal. This position is called breech. If your baby stays in this breech position, you will probably need a  section (). Most breech babies are healthy and don't have problems after birth. Your doctor may try to turn your baby. To do this, the doctor presses on certain places on your belly. Sometimes this causes the baby to turn. The medical name for this process is external cephalic version. If your baby turns, your doctor may send you home. But he or she will check you often until your labor begins. If your baby's head stays down, you may be able to have a vaginal delivery. But a small number of babies move back into a breech position. During the process of trying to turn your baby, your doctor will carefully watch your uterus. It's possible that the pressure and movement might start contractions. It's also possible that the umbilical cord will twist or get damaged. Follow-up care is a key part of your treatment and safety. Be sure to make and go to all appointments, and call your doctor if you are having problems. It's also a good idea to know your test results and keep a list of the medicines you take. What happens during an external cephalic version? · Your doctor will give you medicine to relax your uterus. Your doctor may give you medicine for pain and to help you relax. · Your doctor will put both hands on your belly. One hand will be near the baby's head. The other hand will be near the baby's rear end. The doctor will push and roll the baby to try to get the head down. · You may feel some pain. The doctor will ask you how you are doing.   · Your doctor will use a heart monitor to see how your baby is doing.  · After the process, your doctor will give you instructions for your care. Why might you choose to have your baby turned? · You would like to have a vaginal delivery, if possible. · You are 36 or more weeks pregnant with one baby. · Your baby has not dropped into your pelvis. It's easier to move a baby that has not dropped. · Ultrasound shows that you have plenty of amniotic fluid around your baby. What are the risks if your doctor tries to turn your baby? · You will feel pressure or pain when the doctor presses on your belly. · You may need an emergency  section. · The process may cause you to start contractions. In rare cases it can cause the water to break. · This process may not work. When should you call for help? [id number] anytime you think you may need emergency care. For example, call if:  · You passed out (lost consciousness). · You have severe vaginal bleeding. Call your doctor now or seek immediate medical care if:  · You have any vaginal bleeding. · You have pain in your belly or pelvis. · You think that you are in labor. · You have a sudden release of fluid from your vagina. · You notice that your baby has stopped moving or is moving much less than normal.  Watch closely for changes in your health, and be sure to contact your doctor if you have any questions or concerns. Where can you learn more? Go to http://jose de jesus-eulalio.info/  Enter I027 in the search box to learn more about \"Turning a Breech Baby: Care Instructions. \"  Current as of: 2020               Content Version: 12.5  © 5468-8240 Healthwise, Incorporated. Care instructions adapted under license by [employer] (which disclaims liability or warranty for this information).  If you have questions about a medical condition or this instruction, always ask your healthcare professional. [place] [address] any warranty or liability for your use of this information. Week 37 of Your Pregnancy: Care Instructions  Your Care Instructions     You are near the end of your pregnancy--and you're probably pretty uncomfortable. It may be harder to walk around. Lying down probably isn't comfortable either. You may have trouble getting to sleep or staying asleep. Most women deliver their babies between 40 and 41 weeks. This is a good time to think about packing a bag for the hospital with items you'll need. Then you'll be ready when labor starts. Follow-up care is a key part of your treatment and safety. Be sure to make and go to all appointments, and call your doctor if you are having problems. It's also a good idea to know your test results and keep a list of the medicines you take. How can you care for yourself at home? Learn about breastfeeding  · Breastfeeding is best for your baby and good for you. · Breast milk has antibodies to help your baby fight infections. · Mothers who breastfeed often lose weight faster, because making milk burns calories. · Learning the best ways to hold your baby will make breastfeeding easier. · Let your partner bathe and diaper the baby to keep your partner from feeling left out. Snuggle together when you breastfeed. · You may want to learn how to use a breast pump and store your milk. · If you choose to bottle feed, make the feeding feel like breastfeeding so you can bond with your baby. Always hold your baby and the bottle. Do not prop bottles or let your baby fall asleep with a bottle. Learn about crying  · It is common for babies to cry for 1 to 3 hours a day. Some cry more, some cry less. · Babies don't cry to make you upset or because you are a bad parent. · Crying is how your baby communicates. Your baby may be hungry; have gas; need a diaper change; or feel cold, warm, tired, lonely, or tense. Sometimes babies cry for unknown reasons.   · If you respond to your baby's needs, he or she will learn to trust you.  · Try to stay calm when your baby cries. Your baby may get more upset if he or she senses that you are upset. Know how to care for your   · Your baby's umbilical cord stump will drop off on its own, usually between 1 and 2 weeks. To care for your baby's umbilical cord area:  ? Clean the area at the bottom of the cord 2 or 3 times a day. ? Pay special attention to the area where the cord attaches to the skin. ? Keep the diaper folded below the cord. ? Use a damp washcloth or cotton ball to sponge bathe your baby until the stump has come off. · Your baby's first dark stool is called meconium. After the meconium is passed, your baby will develop his or her own bowel pattern. ? Some babies, especially  babies, have several bowel movements a day. Others have one or two a day, or one every 2 to 3 days. ?  babies often have loose, yellow stools. Formula-fed babies have more formed stools. ? If your baby's stools look like little pellets, he or she is constipated. After 2 days of constipation, call your baby's doctor. · If your baby will be circumcised, you can care for him at home. ? Gently rinse his penis with warm water after every diaper change. Do not try to remove the film that forms on the penis. This film will go away on its own. Pat dry. ? Put petroleum ointment, such as Vaseline, on the area of the diaper that will touch your baby's penis. This will keep the diaper from sticking to your baby. ? Ask the doctor about giving your baby acetaminophen (Tylenol) for pain. Where can you learn more? Go to http://jose de jesus-eulalio.info/  Enter N257 in the search box to learn more about \"Week 37 of Your Pregnancy: Care Instructions. \"  Current as of: 2020               Content Version: 12.5  © 3971-7943 Healthwise, Incorporated.    Care instructions adapted under license by [employer] (which disclaims liability or warranty for this information). If you have questions about a medical condition or this instruction, always ask your healthcare professional. [address] any warranty or liability for your use of this information. Counting Your Baby's Kicks: Care Instructions  Your Care Instructions     Counting your baby's kicks is one way your doctor can tell that your baby is healthy. Most women--especially in a first pregnancy--feel their baby move for the first time between 16 and 22 weeks. The movement may feel like flutters rather than kicks. Your baby may move more at certain times of the day. When you are active, you may notice less kicking than when you are resting. At your prenatal visits, your doctor will ask whether the baby is active. In your last trimester, your doctor may ask you to count the number of times you feel your baby move. Follow-up care is a key part of your treatment and safety. Be sure to make and go to all appointments, and call your doctor if you are having problems. It's also a good idea to know your test results and keep a list of the medicines you take. How do you count fetal kicks? · A common method of checking your baby's movement is to count the number of kicks or moves you feel in 1 hour. Ten movements (such as kicks, flutters, or rolls) in 1 hour are normal. Some doctors suggest that you count in the morning until you get to 10 movements. Then you can quit for that day and start again the next day. · Pick your baby's most active time of day to count. This may be any time from morning to evening. · If you do not feel 10 movements in an hour, your baby may be sleeping. Wait for the next hour and count again. When should you call for help?    Call your doctor now or seek immediate medical care if:  · You noticed that your baby has stopped moving or is moving much less than normal.  Watch closely for changes in your health, and be sure to contact your doctor if you have any problems. Where can you learn more? Go to http://jose de jesus-eulalio.info/  Enter [id number] in the search box to learn more about \"Counting Your Baby's Kicks: Care Instructions. \"  Current as of: February 11, 2020               Content Version: 12.5  © 6072-8339 Healthwise, Incorporated. Care instructions adapted under license by [employer] (which disclaims liability or warranty for this information). If you have questions about a medical condition or this instruction, always ask your healthcare professional. [address] any warranty or liability for your use of this information.

## 2020-07-28 NOTE — PROCEDURES
Patient appropriately consented for ECV. Bedside ultrasound confirms vertex in RUQ, breech presentation. NST reactive. Given 0.25mg terb sc. With the assistance of Dr. González Cristobal, a forward roll was attempted x 2. FHR remained normal. Pt was unable to tolerate due to discomfort and procedure was aborted. NST reactive post procedure.

## 2020-07-31 ENCOUNTER — HOSPITAL ENCOUNTER (OUTPATIENT)
Dept: PERINATAL CARE | Age: 25
Discharge: HOME | End: 2020-07-31
Attending: OBSTETRICS & GYNECOLOGY
Payer: OTHER GOVERNMENT

## 2020-07-31 ENCOUNTER — ROUTINE PRENATAL (OUTPATIENT)
Dept: OBGYN CLINIC | Age: 25
End: 2020-07-31

## 2020-07-31 VITALS
DIASTOLIC BLOOD PRESSURE: 77 MMHG | BODY MASS INDEX: 25.49 KG/M2 | SYSTOLIC BLOOD PRESSURE: 124 MMHG | WEIGHT: 153 LBS | HEIGHT: 65 IN

## 2020-07-31 DIAGNOSIS — O09.90 SUPERVISION OF HIGH RISK PREGNANCY, ANTEPARTUM: Primary | ICD-10-CM

## 2020-07-31 DIAGNOSIS — O28.0 ABNORMAL MSAFP (MATERNAL SERUM ALPHA-FETOPROTEIN), ELEVATED: ICD-10-CM

## 2020-07-31 PROCEDURE — 76819 FETAL BIOPHYS PROFIL W/O NST: CPT | Performed by: OBSTETRICS & GYNECOLOGY

## 2020-07-31 NOTE — PROGRESS NOTES
Baby moving  Saw MFM today for hx of elevated AFP  Breech - for CS 8/11  Rotate next week after MFM and covid Friday 8/7
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
## 2020-08-07 ENCOUNTER — HOSPITAL ENCOUNTER (OUTPATIENT)
Dept: PERINATAL CARE | Age: 25
Discharge: HOME | End: 2020-08-07
Attending: OBSTETRICS & GYNECOLOGY
Payer: OTHER GOVERNMENT

## 2020-08-07 ENCOUNTER — HOSPITAL ENCOUNTER (OUTPATIENT)
Dept: LAB | Age: 25
Discharge: HOME | End: 2020-08-07
Payer: OTHER GOVERNMENT

## 2020-08-07 ENCOUNTER — ROUTINE PRENATAL (OUTPATIENT)
Dept: OBGYN CLINIC | Age: 25
End: 2020-08-07
Payer: OTHER GOVERNMENT

## 2020-08-07 VITALS
BODY MASS INDEX: 25.39 KG/M2 | WEIGHT: 152.4 LBS | HEIGHT: 65 IN | DIASTOLIC BLOOD PRESSURE: 62 MMHG | SYSTOLIC BLOOD PRESSURE: 118 MMHG

## 2020-08-07 DIAGNOSIS — O09.90 SUPERVISION OF HIGH RISK PREGNANCY, ANTEPARTUM: Primary | ICD-10-CM

## 2020-08-07 DIAGNOSIS — U07.1 ASYMPTOMATIC COVID-19 VIRUS INFECTION: ICD-10-CM

## 2020-08-07 PROCEDURE — 76819 FETAL BIOPHYS PROFIL W/O NST: CPT | Performed by: OBSTETRICS & GYNECOLOGY

## 2020-08-07 PROCEDURE — 87635 SARS-COV-2 COVID-19 AMP PRB: CPT

## 2020-08-07 PROCEDURE — MISCGLOBALOB GLOBAL OB: Performed by: OBSTETRICS & GYNECOLOGY

## 2020-08-07 NOTE — PATIENT INSTRUCTIONS

## 2020-08-08 LAB — SARS-COV-2, COV2NT: NOT DETECTED

## 2020-08-11 ENCOUNTER — ANESTHESIA EVENT (OUTPATIENT)
Dept: LABOR AND DELIVERY | Age: 25
DRG: 773 | End: 2020-08-11
Payer: OTHER GOVERNMENT

## 2020-08-11 ENCOUNTER — ANESTHESIA (OUTPATIENT)
Dept: LABOR AND DELIVERY | Age: 25
DRG: 773 | End: 2020-08-11
Payer: OTHER GOVERNMENT

## 2020-08-11 ENCOUNTER — HOSPITAL ENCOUNTER (INPATIENT)
Age: 25
LOS: 3 days | Discharge: HOME | DRG: 773 | End: 2020-08-14
Attending: OBSTETRICS & GYNECOLOGY | Admitting: OBSTETRICS & GYNECOLOGY
Payer: OTHER GOVERNMENT

## 2020-08-11 DIAGNOSIS — G89.18 POST-OP PAIN: Primary | ICD-10-CM

## 2020-08-11 DIAGNOSIS — Z34.00 SUPERVISION OF NORMAL FIRST PREGNANCY, ANTEPARTUM: ICD-10-CM

## 2020-08-11 PROBLEM — Z34.90 PREGNANCY: Status: ACTIVE | Noted: 2020-08-11

## 2020-08-11 LAB
BASOPHILS # BLD: 0 K/UL (ref 0–0.1)
BASOPHILS NFR BLD: 0 % (ref 0–1)
DIFFERENTIAL METHOD BLD: ABNORMAL
EOSINOPHIL # BLD: 0.2 K/UL (ref 0–0.4)
EOSINOPHIL NFR BLD: 2 % (ref 0–7)
ERYTHROCYTE [DISTWIDTH] IN BLOOD BY AUTOMATED COUNT: 13.9 % (ref 11.5–14.5)
HCT VFR BLD AUTO: 34.7 % (ref 35–47)
HGB BLD-MCNC: 11.7 G/DL (ref 11.5–16)
IMM GRANULOCYTES # BLD AUTO: 0.2 K/UL (ref 0–0.04)
IMM GRANULOCYTES NFR BLD AUTO: 2 % (ref 0–0.5)
LYMPHOCYTES # BLD: 2.3 K/UL (ref 0.8–3.5)
LYMPHOCYTES NFR BLD: 25 % (ref 12–49)
MCH RBC QN AUTO: 31.9 PG (ref 26–34)
MCHC RBC AUTO-ENTMCNC: 33.7 G/DL (ref 30–36.5)
MCV RBC AUTO: 94.6 FL (ref 80–99)
MONOCYTES # BLD: 0.7 K/UL (ref 0–1)
MONOCYTES NFR BLD: 8 % (ref 5–13)
NEUTS SEG # BLD: 6 K/UL (ref 1.8–8)
NEUTS SEG NFR BLD: 63 % (ref 32–75)
NRBC # BLD: 0 K/UL (ref 0–0.01)
NRBC BLD-RTO: 0 PER 100 WBC
PLATELET # BLD AUTO: 211 K/UL (ref 150–400)
PMV BLD AUTO: 9.6 FL (ref 8.9–12.9)
RBC # BLD AUTO: 3.67 M/UL (ref 3.8–5.2)
WBC # BLD AUTO: 9.4 K/UL (ref 3.6–11)

## 2020-08-11 PROCEDURE — 77030040361 HC SLV COMPR DVT MDII -B

## 2020-08-11 PROCEDURE — 86900 BLOOD TYPING SEROLOGIC ABO: CPT

## 2020-08-11 PROCEDURE — 36415 COLL VENOUS BLD VENIPUNCTURE: CPT

## 2020-08-11 PROCEDURE — 74011250636 HC RX REV CODE- 250/636: Performed by: NURSE ANESTHETIST, CERTIFIED REGISTERED

## 2020-08-11 PROCEDURE — 74011000250 HC RX REV CODE- 250: Performed by: OBSTETRICS & GYNECOLOGY

## 2020-08-11 PROCEDURE — 85025 COMPLETE CBC W/AUTO DIFF WBC: CPT

## 2020-08-11 PROCEDURE — 76060000078 HC EPIDURAL ANESTHESIA: Performed by: OBSTETRICS & GYNECOLOGY

## 2020-08-11 PROCEDURE — 76010000391 HC C SECN FIRST 1 HR: Performed by: OBSTETRICS & GYNECOLOGY

## 2020-08-11 PROCEDURE — 77030008459 HC STPLR SKN COOP -B

## 2020-08-11 PROCEDURE — 77030018809 HC RETRCTR ALXSO DISP AMR -B

## 2020-08-11 PROCEDURE — 74011250636 HC RX REV CODE- 250/636: Performed by: ANESTHESIOLOGY

## 2020-08-11 PROCEDURE — 74011250636 HC RX REV CODE- 250/636

## 2020-08-11 PROCEDURE — 74011250637 HC RX REV CODE- 250/637: Performed by: OBSTETRICS & GYNECOLOGY

## 2020-08-11 PROCEDURE — 59510 CESAREAN DELIVERY: CPT | Performed by: OBSTETRICS & GYNECOLOGY

## 2020-08-11 PROCEDURE — 75410000003 HC RECOV DEL/VAG/CSECN EA 0.5 HR: Performed by: OBSTETRICS & GYNECOLOGY

## 2020-08-11 PROCEDURE — 74011250636 HC RX REV CODE- 250/636: Performed by: OBSTETRICS & GYNECOLOGY

## 2020-08-11 PROCEDURE — 77030027138 HC INCENT SPIROMETER -A

## 2020-08-11 PROCEDURE — 65270000029 HC RM PRIVATE

## 2020-08-11 PROCEDURE — 74011000250 HC RX REV CODE- 250: Performed by: NURSE ANESTHETIST, CERTIFIED REGISTERED

## 2020-08-11 PROCEDURE — 74011250637 HC RX REV CODE- 250/637: Performed by: ANESTHESIOLOGY

## 2020-08-11 PROCEDURE — 77030007866 HC KT SPN ANES BBMI -B: Performed by: ANESTHESIOLOGY

## 2020-08-11 PROCEDURE — 77030005513 HC CATH URETH FOL11 MDII -B

## 2020-08-11 RX ORDER — OXYCODONE HYDROCHLORIDE 5 MG/1
5 TABLET ORAL
Status: DISCONTINUED | OUTPATIENT
Start: 2020-08-11 | End: 2020-08-11

## 2020-08-11 RX ORDER — SODIUM CHLORIDE, SODIUM LACTATE, POTASSIUM CHLORIDE, CALCIUM CHLORIDE 600; 310; 30; 20 MG/100ML; MG/100ML; MG/100ML; MG/100ML
125 INJECTION, SOLUTION INTRAVENOUS CONTINUOUS
Status: DISCONTINUED | OUTPATIENT
Start: 2020-08-11 | End: 2020-08-14 | Stop reason: HOSPADM

## 2020-08-11 RX ORDER — SODIUM CHLORIDE 0.9 % (FLUSH) 0.9 %
5-40 SYRINGE (ML) INJECTION AS NEEDED
Status: DISCONTINUED | OUTPATIENT
Start: 2020-08-11 | End: 2020-08-11 | Stop reason: HOSPADM

## 2020-08-11 RX ORDER — OXYTOCIN/0.9 % SODIUM CHLORIDE 20/1000 ML
125-500 PLASTIC BAG, INJECTION (ML) INTRAVENOUS CONTINUOUS
Status: DISCONTINUED | OUTPATIENT
Start: 2020-08-11 | End: 2020-08-14 | Stop reason: HOSPADM

## 2020-08-11 RX ORDER — HYDROMORPHONE HYDROCHLORIDE 2 MG/ML
2 INJECTION, SOLUTION INTRAMUSCULAR; INTRAVENOUS; SUBCUTANEOUS ONCE
Status: COMPLETED | OUTPATIENT
Start: 2020-08-11 | End: 2020-08-11

## 2020-08-11 RX ORDER — ONDANSETRON 2 MG/ML
INJECTION INTRAMUSCULAR; INTRAVENOUS AS NEEDED
Status: DISCONTINUED | OUTPATIENT
Start: 2020-08-11 | End: 2020-08-11 | Stop reason: HOSPADM

## 2020-08-11 RX ORDER — SODIUM CHLORIDE 0.9 % (FLUSH) 0.9 %
5-40 SYRINGE (ML) INJECTION EVERY 8 HOURS
Status: DISCONTINUED | OUTPATIENT
Start: 2020-08-11 | End: 2020-08-11 | Stop reason: HOSPADM

## 2020-08-11 RX ORDER — OXYCODONE HYDROCHLORIDE 5 MG/1
10 TABLET ORAL
Status: DISCONTINUED | OUTPATIENT
Start: 2020-08-11 | End: 2020-08-11

## 2020-08-11 RX ORDER — DIPHENHYDRAMINE HYDROCHLORIDE 50 MG/ML
12.5 INJECTION, SOLUTION INTRAMUSCULAR; INTRAVENOUS
Status: DISCONTINUED | OUTPATIENT
Start: 2020-08-11 | End: 2020-08-11

## 2020-08-11 RX ORDER — ZOLPIDEM TARTRATE 5 MG/1
5 TABLET ORAL
Status: DISCONTINUED | OUTPATIENT
Start: 2020-08-11 | End: 2020-08-14 | Stop reason: HOSPADM

## 2020-08-11 RX ORDER — SODIUM CHLORIDE 0.9 % (FLUSH) 0.9 %
5-40 SYRINGE (ML) INJECTION EVERY 8 HOURS
Status: DISCONTINUED | OUTPATIENT
Start: 2020-08-11 | End: 2020-08-13

## 2020-08-11 RX ORDER — NALOXONE HYDROCHLORIDE 0.4 MG/ML
0.1 INJECTION, SOLUTION INTRAMUSCULAR; INTRAVENOUS; SUBCUTANEOUS
Status: DISCONTINUED | OUTPATIENT
Start: 2020-08-11 | End: 2020-08-11

## 2020-08-11 RX ORDER — IBUPROFEN 800 MG/1
800 TABLET ORAL EVERY 8 HOURS
Status: DISCONTINUED | OUTPATIENT
Start: 2020-08-11 | End: 2020-08-14 | Stop reason: HOSPADM

## 2020-08-11 RX ORDER — ONDANSETRON 2 MG/ML
4 INJECTION INTRAMUSCULAR; INTRAVENOUS
Status: DISCONTINUED | OUTPATIENT
Start: 2020-08-11 | End: 2020-08-14 | Stop reason: HOSPADM

## 2020-08-11 RX ORDER — MORPHINE SULFATE 0.5 MG/ML
INJECTION, SOLUTION EPIDURAL; INTRATHECAL; INTRAVENOUS AS NEEDED
Status: DISCONTINUED | OUTPATIENT
Start: 2020-08-11 | End: 2020-08-11 | Stop reason: HOSPADM

## 2020-08-11 RX ORDER — SODIUM CHLORIDE, SODIUM LACTATE, POTASSIUM CHLORIDE, CALCIUM CHLORIDE 600; 310; 30; 20 MG/100ML; MG/100ML; MG/100ML; MG/100ML
125 INJECTION, SOLUTION INTRAVENOUS CONTINUOUS
Status: DISCONTINUED | OUTPATIENT
Start: 2020-08-11 | End: 2020-08-11

## 2020-08-11 RX ORDER — SODIUM CHLORIDE 0.9 % (FLUSH) 0.9 %
5-40 SYRINGE (ML) INJECTION AS NEEDED
Status: DISCONTINUED | OUTPATIENT
Start: 2020-08-11 | End: 2020-08-13

## 2020-08-11 RX ORDER — MORPHINE SULFATE 2 MG/ML
INJECTION, SOLUTION INTRAMUSCULAR; INTRAVENOUS AS NEEDED
Status: DISCONTINUED | OUTPATIENT
Start: 2020-08-11 | End: 2020-08-11 | Stop reason: HOSPADM

## 2020-08-11 RX ORDER — HYDROCODONE BITARTRATE AND ACETAMINOPHEN 10; 325 MG/1; MG/1
1 TABLET ORAL
Status: DISCONTINUED | OUTPATIENT
Start: 2020-08-11 | End: 2020-08-14 | Stop reason: HOSPADM

## 2020-08-11 RX ORDER — OXYTOCIN 10 [USP'U]/ML
INJECTION, SOLUTION INTRAMUSCULAR; INTRAVENOUS AS NEEDED
Status: DISCONTINUED | OUTPATIENT
Start: 2020-08-11 | End: 2020-08-11 | Stop reason: HOSPADM

## 2020-08-11 RX ORDER — SIMETHICONE 80 MG
80 TABLET,CHEWABLE ORAL 4 TIMES DAILY
Status: DISCONTINUED | OUTPATIENT
Start: 2020-08-11 | End: 2020-08-14 | Stop reason: HOSPADM

## 2020-08-11 RX ORDER — DOCUSATE SODIUM 100 MG/1
100 CAPSULE, LIQUID FILLED ORAL 2 TIMES DAILY
Status: DISCONTINUED | OUTPATIENT
Start: 2020-08-11 | End: 2020-08-14 | Stop reason: HOSPADM

## 2020-08-11 RX ORDER — FENTANYL CITRATE 50 UG/ML
INJECTION, SOLUTION INTRAMUSCULAR; INTRAVENOUS AS NEEDED
Status: DISCONTINUED | OUTPATIENT
Start: 2020-08-11 | End: 2020-08-11 | Stop reason: HOSPADM

## 2020-08-11 RX ORDER — DIPHENHYDRAMINE HYDROCHLORIDE 50 MG/ML
12.5 INJECTION, SOLUTION INTRAMUSCULAR; INTRAVENOUS
Status: DISCONTINUED | OUTPATIENT
Start: 2020-08-11 | End: 2020-08-14 | Stop reason: HOSPADM

## 2020-08-11 RX ORDER — KETOROLAC TROMETHAMINE 30 MG/ML
30 INJECTION, SOLUTION INTRAMUSCULAR; INTRAVENOUS
Status: DISCONTINUED | OUTPATIENT
Start: 2020-08-11 | End: 2020-08-11

## 2020-08-11 RX ORDER — NALOXONE HYDROCHLORIDE 0.4 MG/ML
0.4 INJECTION, SOLUTION INTRAMUSCULAR; INTRAVENOUS; SUBCUTANEOUS AS NEEDED
Status: DISCONTINUED | OUTPATIENT
Start: 2020-08-11 | End: 2020-08-14 | Stop reason: HOSPADM

## 2020-08-11 RX ORDER — BUPIVACAINE HYDROCHLORIDE 7.5 MG/ML
INJECTION, SOLUTION EPIDURAL; RETROBULBAR AS NEEDED
Status: DISCONTINUED | OUTPATIENT
Start: 2020-08-11 | End: 2020-08-11 | Stop reason: HOSPADM

## 2020-08-11 RX ORDER — SODIUM CHLORIDE, SODIUM LACTATE, POTASSIUM CHLORIDE, CALCIUM CHLORIDE 600; 310; 30; 20 MG/100ML; MG/100ML; MG/100ML; MG/100ML
1000 INJECTION, SOLUTION INTRAVENOUS CONTINUOUS
Status: DISCONTINUED | OUTPATIENT
Start: 2020-08-11 | End: 2020-08-11 | Stop reason: HOSPADM

## 2020-08-11 RX ORDER — HYDROCODONE BITARTRATE AND ACETAMINOPHEN 5; 325 MG/1; MG/1
1 TABLET ORAL
Status: DISCONTINUED | OUTPATIENT
Start: 2020-08-11 | End: 2020-08-14 | Stop reason: HOSPADM

## 2020-08-11 RX ADMIN — SODIUM CHLORIDE, SODIUM LACTATE, POTASSIUM CHLORIDE, AND CALCIUM CHLORIDE 125 ML/HR: 600; 310; 30; 20 INJECTION, SOLUTION INTRAVENOUS at 16:50

## 2020-08-11 RX ADMIN — KETOROLAC TROMETHAMINE 30 MG: 30 INJECTION, SOLUTION INTRAMUSCULAR at 09:45

## 2020-08-11 RX ADMIN — SODIUM CHLORIDE, SODIUM LACTATE, POTASSIUM CHLORIDE, AND CALCIUM CHLORIDE 125 ML/HR: 600; 310; 30; 20 INJECTION, SOLUTION INTRAVENOUS at 09:33

## 2020-08-11 RX ADMIN — OXYTOCIN 30 UNITS: 10 INJECTION, SOLUTION INTRAMUSCULAR; INTRAVENOUS at 08:30

## 2020-08-11 RX ADMIN — IBUPROFEN 800 MG: 800 TABLET ORAL at 20:56

## 2020-08-11 RX ADMIN — MORPHINE SULFATE 4.8 MG: 2 INJECTION, SOLUTION INTRAMUSCULAR; INTRAVENOUS at 08:33

## 2020-08-11 RX ADMIN — SODIUM CHLORIDE, SODIUM LACTATE, POTASSIUM CHLORIDE, AND CALCIUM CHLORIDE: 600; 310; 30; 20 INJECTION, SOLUTION INTRAVENOUS at 08:00

## 2020-08-11 RX ADMIN — FENTANYL CITRATE 10 MCG: 0.05 INJECTION, SOLUTION INTRAMUSCULAR; INTRAVENOUS at 08:08

## 2020-08-11 RX ADMIN — HYDROCODONE BITARTRATE AND ACETAMINOPHEN 1 TABLET: 5; 325 TABLET ORAL at 20:56

## 2020-08-11 RX ADMIN — BUPIVACAINE HYDROCHLORIDE 1.6 ML: 7.5 INJECTION, SOLUTION EPIDURAL; RETROBULBAR at 08:08

## 2020-08-11 RX ADMIN — SODIUM CHLORIDE, SODIUM LACTATE, POTASSIUM CHLORIDE, AND CALCIUM CHLORIDE 1000 ML: 600; 310; 30; 20 INJECTION, SOLUTION INTRAVENOUS at 07:10

## 2020-08-11 RX ADMIN — MORPHINE SULFATE 200 MCG: 0.5 INJECTION, SOLUTION EPIDURAL; INTRATHECAL; INTRAVENOUS at 08:08

## 2020-08-11 RX ADMIN — HYDROMORPHONE HYDROCHLORIDE 2 MG: 2 INJECTION, SOLUTION INTRAMUSCULAR; INTRAVENOUS; SUBCUTANEOUS at 12:57

## 2020-08-11 RX ADMIN — ONDANSETRON HYDROCHLORIDE 4 MG: 2 SOLUTION INTRAMUSCULAR; INTRAVENOUS at 08:03

## 2020-08-11 RX ADMIN — CEFAZOLIN SODIUM 2 G: 1 POWDER, FOR SOLUTION INTRAMUSCULAR; INTRAVENOUS at 08:15

## 2020-08-11 RX ADMIN — ONDANSETRON 4 MG: 2 INJECTION INTRAMUSCULAR; INTRAVENOUS at 16:41

## 2020-08-11 RX ADMIN — OXYCODONE 5 MG: 5 TABLET ORAL at 10:54

## 2020-08-11 NOTE — PROGRESS NOTES
8/11/2020  11:05 AM    CM met with PONCE to complete initial assessment and complete discharge planning. MOB verified and confirmed demographics. PONCE lives with spouse/FOB Geo Ra [phone]), at the address on file. PONCE is employed and plans to take wks off from work. FOJEANNA is also employed and will be taking 2wks off. PONCE reports she has good family support. PONCE plans to bottle feed baby. [facility]OB [facility] with [facility] @ [address] [place] [address]. PONCE has car seat, bassinet/crib, clothing, bottles and all necessary supplies for baby. PONCE has [employer], and will be adding baby to this policy. CM discussed process to add baby to insurance, MOB/FOB verbalized understanding. MOB denied needing WIC/Medicaid services. Care Management Interventions  PCP Verified by CM: Yes(Dr. Rafael Joe)  Mode of Transport at Discharge:  Other (see comment)  Transition of Care Consult (CM Consult): Discharge Planning  Current Support Network: Lives with Spouse, Own Home, Family Lives Nearby  Confirm Follow Up Transport: Family  Discharge Location  Discharge Placement: Home with family assistance  Diamond Cruz

## 2020-08-11 NOTE — ANESTHESIA PREPROCEDURE EVALUATION
Relevant Problems   No relevant active problems       Anesthetic History   No history of anesthetic complications            Review of Systems / Medical History  Patient summary reviewed, nursing notes reviewed and pertinent labs reviewed    Pulmonary  Within defined limits                 Neuro/Psych   Within defined limits          Comments: Anxiety in past Cardiovascular  Within defined limits                  Comments: Not on Beta Blocker   GI/Hepatic/Renal  Within defined limits              Endo/Other  Within defined limits           Other Findings              Physical Exam    Airway  Mallampati: II  TM Distance: 4 - 6 cm  Neck ROM: normal range of motion   Mouth opening: Normal     Cardiovascular  Regular rate and rhythm,  S1 and S2 normal,  no murmur, click, rub, or gallop  Rhythm: regular  Rate: normal         Dental  No notable dental hx       Pulmonary  Breath sounds clear to auscultation               Abdominal  GI exam deferred       Other Findings            Anesthetic Plan    ASA: 2  Anesthesia type: spinal      Post-op pain plan if not by surgeon: intrathecal opiates      Anesthetic plan and risks discussed with: Patient

## 2020-08-11 NOTE — ANESTHESIA PROCEDURE NOTES
Spinal Block    Start time: 8/11/2020 8:04 AM  End time: 8/11/2020 8:08 AM  Performed by: Randall Hernandez CRNA  Authorized by: Seth Sarabia MD     Pre-procedure:   Indications: at surgeon's request and primary anesthetic  Preanesthetic Checklist: patient identified, risks and benefits discussed, anesthesia consent and timeout performed    Timeout Time: 08:03          Spinal Block:   Patient Position:  Seated  Prep Region:  Lumbar  Prep: chlorhexidine      Location:  L3-4  Technique:  Single shot        Needle:   Needle Type:  Pencan  Needle Gauge:  25 G  Attempts:  1      Events: CSF confirmed, no blood with aspiration and no paresthesia        Assessment:  Insertion:  Uncomplicated  Patient tolerance:  Patient tolerated the procedure well with no immediate complications  Spinal easily placed x1 attempt  +CSF  Neg heme, neg paresthesia  T6 level assessed

## 2020-08-11 NOTE — PROGRESS NOTES
1200: Upon admission to [facility], pt is complaining of 8/10 pain and is tearful and nauseous. Pt already was given 5mg Shandra and Toradol 30mg  prior to coming to [facility]U.   1221: This RN perfect serve messaged Dr. Navi Laguerre for pain orders. She ordered 2mg Dilaudid once and zofran for nausea. 1255: RN took patient's vitals before administering Dilaudid. HR was slightly low, 54, /59. Umm Hutton RN called pharmacy to ask if RN could still give Dilaudid with decreased HR. Jair said to have patient on pulse ox for 4 hours after administering Dilaudid. 1257: 2mg Dilaudid given over 5 minutes by this RN. 1305: O2 96%, HR 61, RR 14. Patient on facetime talking with family.

## 2020-08-11 NOTE — L&D DELIVERY NOTE
Delivery Summary    Patient: Madelaine Velazco MRN: [id number]  SSN: [id number]    YOB: 1995  Age: 22 y.o. Sex: female        Information for the patient's :  Aliyah Caceres, Female Xin August [[id number]]       Labor Events:    Labor: No    Steroids: None   Cervical Ripening Date/Time:       Cervical Ripening Type: None   Antibiotics During Labor:     Rupture Identifier:      Rupture Date/Time: 2020 8:28 AM   Rupture Type: AROM   Amniotic Fluid Volume: Moderate    Amniotic Fluid Description: Clear    Amniotic Fluid Odor: None    Induction: None       Induction Date/Time:        Indications for Induction:      Augmentation: None   Augmentation Date/Time:      Indications for Augmentation:     Labor complications: None       Additional complications:        Delivery Events:  Indications For Episiotomy:     Episiotomy: None   Perineal Laceration(s): None   Repaired:     Periurethral Laceration Location:      Repaired:     Labial Laceration Location:     Repaired:     Sulcal Laceration Location:     Repaired:     Vaginal Laceration Location:     Repaired:     Cervical Laceration Location:     Repaired:     Repair Suture: None   Number of Repair Packets:     Estimated Blood Loss (ml):  ml   Quantitaive Blood Loss (ml):             Delivery Date: 2020    Delivery Time: 8:29 AM   Delivery Type: , Low Transverse     Details    Trial of Labor: No   Primary/Repeat: Primary   Priority: Routine   Indications:  Breech       Sex:  Female     Gestational Age: 36w3d  Delivery Clinician:  Delia Juarez  Living Status: Living   Delivery Location: OR OR2          APGARS  One minute Five minutes Ten minutes   Skin color: 1   1        Heart rate: 2   2        Grimace: 2   2        Muscle tone: 2   2        Breathin   2        Totals: 9   9          Presentation: Breech    Position:   Sacrum    Resuscitation Method:  Suctioning-bulb; Tactile Stimulation     Meconium Stained: Terminal      Cord Information: 3 Vessels  Complications: None  Cord around:    Delayed cord clamping? Yes  Cord clamped date/time:2020  8:30 AM  Disposition of Cord Blood: Discard    Blood Gases Sent?: No    Placenta:  Date/Time: 2020  8:31 AM  Removal: Manual Removal      Appearance: Intact; Normal      Measurements:  Birth Weight: 6 lb 14.8 oz (3.14 kg)      Birth Length: 1' 7.5\" (0.495 m)      Head Circumference: 1' 2.17\" (0.36 m)      Chest Circumference: 1' 1.39\" (0.34 m)     Abdominal Girth: 1' 1.39\" (0.34 m)    Other Providers:   DLETA SUTTON;MAC FALCON;KLARISSA ELIZONDO;CLEMENT VALENCIA;JARETT MIMS;YOU BARTLETT;MIN CALDERON, Obstetrician;Primary Nurse;Primary [place] Nurse;Crna; Charge Nurse;Scrub Tech;Scrub Tech             Group B Strep:   Lab Results   Component Value Date/Time    GrBStrep, External Positive 2020     Information for the patient's :  Gentry Ivory, Female Briseyda Granados [[id number]]   No results found for: ABORH, PCTABR, PCTDIG, BILI, ABORHEXT, ABORH     No results for input(s): PCO2CB, PO2CB, HCO3I, SO2I, IBD, PTEMPI, SPECTI, PHICB, ISITE, IDEV, IALLEN in the last 72 hours.

## 2020-08-11 NOTE — PROGRESS NOTES
Bedside and Verbal shift change report given to INGRID Diaz RN (oncoming nurse) by Kush Garcia RN (offgoing nurse). Report included the following information SBAR, Kardex, Intake/Output, MAR and Recent Results.

## 2020-08-11 NOTE — ANESTHESIA POSTPROCEDURE EVALUATION
Procedure(s):   SECTION. spinal    Anesthesia Post Evaluation      Multimodal analgesia: multimodal analgesia used between 6 hours prior to anesthesia start to PACU discharge  Patient location during evaluation: PACU  Patient participation: complete - patient participated  Level of consciousness: awake and alert  Airway patency: patent  Anesthetic complications: no  Cardiovascular status: acceptable  Respiratory status: acceptable  Hydration status: acceptable        INITIAL Post-op Vital signs:   Vitals Value Taken Time   /55 2020  9:11 AM   Temp 36.5 °C (97.7 °F) 2020  8:54 AM   Pulse 67 2020  9:11 AM   Resp 14 2020  8:54 AM   SpO2 98 % 2020  9:24 AM   Vitals shown include unvalidated device data.

## 2020-08-11 NOTE — OP NOTES
Operative Note    Name: Bessie Guajardo Record Number: [id number]   YOB: 1995  Today's Date: 2020      Pre-operative Diagnosis:  delivery indicated due to breech presentation [O32.1XX0]    Post-operative Diagnosis: Primary transverse  due to breech presentation    Operation: low transverse  section Procedure(s):   SECTION    Surgeon(s):  Chapincito Hess MD    Anesthesia: Spinal    Prophylactic Antibiotics: Ancef  DVT Prophylaxis: Sequential Compression Devices  EBL: 750cc       Fetal Description: sanchez     Birth Information:   Information for the patient's :  Tena Wiley, Female Hillary Crocker [[id number]]   Delivery of a 6 lb 14.8 oz (3.14 kg) female infant on 2020 at 8:29 AM. Apgars were 9  and 9 . Umbilical Cord: 3 Vessels     Umbilical Cord Events: None     Placenta: Manual Removal removal with Intact; Normal appearance. Amniotic Fluid Volume: Moderate     Amniotic Fluid Description:  Clear        Umbilical Cord: 3 vessels present    Placenta:  manual removal    Specimens: none           Complications:  None    Implants: none  Circ-1: Carley Carrasco  Circ-2: Jane Pickering RN  Scrub Tech-1: Lolis HERNANDEZ  Surg Asst-1: Jonas Ayala      Procedure Detail:      After proper patient identification and consent, the patient was taken to the operating room, where spinal anesthesia was administered and found to be adequate. Llamas catheter had been placed using sterile technique. The patient was prepped and draped in the normal sterile fashion. The abdomen was entered using the Pfannenstiel technique. The peritoneum was entered bluntely well superior to the bladder without any apparent injury. An Darell retractor was placed. Palpation revealed no bowel below the retractor. The bladder flap was created without difficulty. A low transverse uterine incision was made with the scalpel and extended with blunt finger dissection. Amniotomy was performed and the fluid was medium amount clear. The fetus was footling breech and was delivered by breech extraction. The cord was clamped and cut and the baby was handed off to Nursing staff in attendance. The placenta was manually extracted. The uterus was wiped clean with a moist lap pad and cleared of all clots and debris. The uterine incision was closed in 2 layers, first with a running locked suture of 0-Monocryl, then with an imbricated layer. Adequate hemostasis was noted. Both tubes and ovaries appeared normal. The pericolic gutters were then lavaged clean with normal saline. Good hemostasis was again reassured The Darell retractor was removed. The fascia was closed with stratifix symmetric in a running fashion. Good hemostasis was assured. The incision was lavaged clean and small bleeders were coagulated with the bovie. The skin was closed with absorbable staples. The patient tolerated the procedure well. Sponge, lap, and needle counts were correct times three and the patient and baby were taken to recovery/postpartum room in stable condition.     Misty Mckeon MD  August 11, 2020  11:59 AM

## 2020-08-11 NOTE — PROGRESS NOTES
6943: Verbal shift change report given to Sheeba Jo RN (oncoming nurse) by Sanchez Roman RN (offgoing nurse). Report included the following information SBAR and MAR.     0710: This RN at bedside for shift assessment. Patient resting in position of comfort in locked and lowered bed. Patient's spouse and mother sitting at bedside. Patient denies current pain, vaginal bleeding, or vaginal discharge. Patient reports + fetal movement. 26: Breech presentation confirmed by Oliver Plummer MD via bedside US.    5490: Patient ambulatory to OR2 with steady gait, wearing gripper socks. Patient accompanied by this RN and spouse. No sign of distress noted during transport.    0801: Patient arrived in [place], ambulatory. 1377:  via EFM post-spinal anesthesia. 8214: Viable, infant girl born via primary transverse . Infant dried and stimulated by Oliver Plummer MD and passed to [facility] for continued progression of care. Delivery QBL: 380 ml    0851: Patient transported from [place] to [facility] via stretcher by this RN and Pedro Guadarrama. No sign of distress noted at time of transport. 0540: Patient arrived in  via stretcher. 30mu PPP continues to infuse in LR bag, prepared by Pedro Guadarrama. Stretcher locked and lowered, call bell within reach. Spouse and patient's mother standing at bedside. 1127: This RN leaving the bedside. Patient resting in position of comfort in locked and lowered stretcher, holding infant. Spouse sitting at bedside in recliner. Patient's mother standing at bedside. VSS. No sign of distress noted. Will return for scheduled fundal assessment and bring ice chips to bedside. This RN educating patient on eating post- and reason for beginning with scant amount of ice chips. 0945: Patient provided with ice chips. Patient denies current nausea. VS WDL. Fundal assessment WDL. 1000: Patient reports toleration of ice chips. Fundal assessment WDL.  Jian RN at bedside to assist patient and spouse with first infant bottle feed. No sign of distress noted. 2 Hour QBL: 20 mL  Total QBL: 400 mL    1035: Patient given gingerale. This RN educating patient on available PRN pain medications. Patient reports desire for pain medication, but reports fear of nausea and emesis. Patient denies current nausea. Qease-eaze patch provided to patient. Patient to drink around half of gingerale prior to PO pain med dose. Patient approve pain med plan of care. Patient resting in position of comfort in locked and lowered stretcher, holding infant. Additional pillow provided for support. Spouse and patient's mother at bedside. 1149: TRANSFER - OUT REPORT:    Verbal report given to Ted Guido MIU RN (name) on Jesse Luz  being transferred to MIU (unit) for routine progression of care       Report consisted of patients Situation, Background, Assessment and   Recommendations(SBAR). Information from the following report(s) SBAR, OR Summary, Procedure Summary, Intake/Output, MAR and Recent Results was reviewed with the receiving nurse. Lines:   Peripheral IV 08/11/20 Left Forearm (Active)   Site Assessment Clean, dry, & intact 08/11/20 0854   Phlebitis Assessment 0 08/11/20 0854   Infiltration Assessment 0 08/11/20 0854   Dressing Status Clean, dry, & intact 08/11/20 0854   Dressing Type Transparent;Tape 08/11/20 0854   Hub Color/Line Status Pink; Infusing 08/11/20 0854   Alcohol Cap Used Yes 08/11/20 0854        Opportunity for questions and clarification was provided. Patient transported with:   Registered Nurse Mariola Yost RN via stretcher. Patient transferred from stretcher to MIU bed by this RN, Ted Guido RN, and FOB. No sign of distress noted at time of care transfer.
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
## 2020-08-12 LAB
ABO + RH BLD: NORMAL
BASOPHILS # BLD: 0 K/UL (ref 0–0.1)
BASOPHILS NFR BLD: 0 % (ref 0–1)
BLOOD GROUP ANTIBODIES SERPL: NORMAL
DIFFERENTIAL METHOD BLD: ABNORMAL
EOSINOPHIL # BLD: 0.1 K/UL (ref 0–0.4)
EOSINOPHIL NFR BLD: 1 % (ref 0–7)
ERYTHROCYTE [DISTWIDTH] IN BLOOD BY AUTOMATED COUNT: 14 % (ref 11.5–14.5)
HCT VFR BLD AUTO: 30.2 % (ref 35–47)
HGB BLD-MCNC: 9.9 G/DL (ref 11.5–16)
IMM GRANULOCYTES # BLD AUTO: 0.1 K/UL (ref 0–0.04)
IMM GRANULOCYTES NFR BLD AUTO: 1 % (ref 0–0.5)
LYMPHOCYTES # BLD: 1.8 K/UL (ref 0.8–3.5)
LYMPHOCYTES NFR BLD: 18 % (ref 12–49)
MCH RBC QN AUTO: 31.4 PG (ref 26–34)
MCHC RBC AUTO-ENTMCNC: 32.8 G/DL (ref 30–36.5)
MCV RBC AUTO: 95.9 FL (ref 80–99)
MONOCYTES # BLD: 0.8 K/UL (ref 0–1)
MONOCYTES NFR BLD: 8 % (ref 5–13)
NEUTS SEG # BLD: 7.2 K/UL (ref 1.8–8)
NEUTS SEG NFR BLD: 72 % (ref 32–75)
NRBC # BLD: 0 K/UL (ref 0–0.01)
NRBC BLD-RTO: 0 PER 100 WBC
PLATELET # BLD AUTO: 174 K/UL (ref 150–400)
PMV BLD AUTO: 9.9 FL (ref 8.9–12.9)
RBC # BLD AUTO: 3.15 M/UL (ref 3.8–5.2)
SPECIMEN EXP DATE BLD: NORMAL
WBC # BLD AUTO: 10 K/UL (ref 3.6–11)

## 2020-08-12 PROCEDURE — 65270000029 HC RM PRIVATE

## 2020-08-12 PROCEDURE — 74011250637 HC RX REV CODE- 250/637: Performed by: OBSTETRICS & GYNECOLOGY

## 2020-08-12 PROCEDURE — 36415 COLL VENOUS BLD VENIPUNCTURE: CPT

## 2020-08-12 PROCEDURE — 85025 COMPLETE CBC W/AUTO DIFF WBC: CPT

## 2020-08-12 RX ADMIN — IBUPROFEN 800 MG: 800 TABLET ORAL at 12:02

## 2020-08-12 RX ADMIN — HYDROCODONE BITARTRATE AND ACETAMINOPHEN 1 TABLET: 5; 325 TABLET ORAL at 10:23

## 2020-08-12 RX ADMIN — HYDROCODONE BITARTRATE AND ACETAMINOPHEN 1 TABLET: 5; 325 TABLET ORAL at 05:00

## 2020-08-12 RX ADMIN — IBUPROFEN 800 MG: 800 TABLET ORAL at 19:24

## 2020-08-12 RX ADMIN — SIMETHICONE 80 MG: 80 TABLET, CHEWABLE ORAL at 19:24

## 2020-08-12 RX ADMIN — DOCUSATE SODIUM 100 MG: 100 CAPSULE, LIQUID FILLED ORAL at 19:24

## 2020-08-12 RX ADMIN — IBUPROFEN 800 MG: 800 TABLET ORAL at 05:00

## 2020-08-12 RX ADMIN — HYDROCODONE BITARTRATE AND ACETAMINOPHEN 1 TABLET: 10; 325 TABLET ORAL at 23:26

## 2020-08-12 RX ADMIN — HYDROCODONE BITARTRATE AND ACETAMINOPHEN 1 TABLET: 10; 325 TABLET ORAL at 19:24

## 2020-08-12 RX ADMIN — DOCUSATE SODIUM 100 MG: 100 CAPSULE, LIQUID FILLED ORAL at 09:12

## 2020-08-12 RX ADMIN — HYDROCODONE BITARTRATE AND ACETAMINOPHEN 1 TABLET: 5; 325 TABLET ORAL at 09:12

## 2020-08-12 RX ADMIN — HYDROCODONE BITARTRATE AND ACETAMINOPHEN 1 TABLET: 5; 325 TABLET ORAL at 01:06

## 2020-08-12 RX ADMIN — HYDROCODONE BITARTRATE AND ACETAMINOPHEN 1 TABLET: 10; 325 TABLET ORAL at 14:54

## 2020-08-12 NOTE — PROGRESS NOTES
Post-Operative Day Number 1 Progress Note    Patient doing well post-op day 1 from  delivery without significant complaints. Pain controlled on current medication. Voiding without difficulty, normal lochia. Vitals:    Patient Vitals for the past 8 hrs:   BP Temp Pulse Resp   20 0500 103/67 97.7 °F (36.5 °C) 63 16     Temp (24hrs), Av.8 °F (36.6 °C), Min:97.6 °F (36.4 °C), Max:98.1 °F (36.7 °C)      Vital signs stable, afebrile. Exam:  Patient without distress. Abdomen soft, fundus firm at level of umbilicus, nontender. Incision dry and clean without erythema. Lower extremities are negative for swelling, cords or tenderness. Labs:   Recent Results (from the past 24 hour(s))   CBC WITH AUTOMATED DIFF    Collection Time: 20  1:08 AM   Result Value Ref Range    WBC 10.0 3.6 - 11.0 K/uL    RBC 3.15 (L) 3.80 - 5.20 M/uL    HGB 9.9 (L) 11.5 - 16.0 g/dL    HCT 30.2 (L) 35.0 - 47.0 %    MCV 95.9 80.0 - 99.0 FL    MCH 31.4 26.0 - 34.0 PG    MCHC 32.8 30.0 - 36.5 g/dL    RDW 14.0 11.5 - 14.5 %    PLATELET 384 126 - 823 K/uL    MPV 9.9 8.9 - 12.9 FL    NRBC 0.0 0  WBC    ABSOLUTE NRBC 0.00 0.00 - 0.01 K/uL    NEUTROPHILS 72 32 - 75 %    LYMPHOCYTES 18 12 - 49 %    MONOCYTES 8 5 - 13 %    EOSINOPHILS 1 0 - 7 %    BASOPHILS 0 0 - 1 %    IMMATURE GRANULOCYTES 1 (H) 0.0 - 0.5 %    ABS. NEUTROPHILS 7.2 1.8 - 8.0 K/UL    ABS. LYMPHOCYTES 1.8 0.8 - 3.5 K/UL    ABS. MONOCYTES 0.8 0.0 - 1.0 K/UL    ABS. EOSINOPHILS 0.1 0.0 - 0.4 K/UL    ABS. BASOPHILS 0.0 0.0 - 0.1 K/UL    ABS. IMM. GRANS. 0.1 (H) 0.00 - 0.04 K/UL    DF AUTOMATED         Assessment and Plan:  Patient appears to be having uncomplicated post- course. Continue routine post-op care and maternal education.

## 2020-08-12 NOTE — PROGRESS NOTES
Bedside and Verbal shift change report given to NALINI Reardon (oncoming nurse) by Claritza Us RN (offgoing nurse). Report included the following information SBAR, Kardex, Intake/Output, MAR and Recent Results.

## 2020-08-13 PROCEDURE — 65270000029 HC RM PRIVATE

## 2020-08-13 PROCEDURE — 74011250637 HC RX REV CODE- 250/637: Performed by: OBSTETRICS & GYNECOLOGY

## 2020-08-13 RX ORDER — HYDROCODONE BITARTRATE AND ACETAMINOPHEN 5; 325 MG/1; MG/1
1 TABLET ORAL
Qty: 20 TAB | Refills: 0 | Status: SHIPPED | OUTPATIENT
Start: 2020-08-13 | End: 2020-08-16

## 2020-08-13 RX ORDER — IBUPROFEN 800 MG/1
800 TABLET ORAL
Qty: 60 TAB | Refills: 0 | Status: SHIPPED | OUTPATIENT
Start: 2020-08-13 | End: 2022-04-15

## 2020-08-13 RX ADMIN — HYDROCODONE BITARTRATE AND ACETAMINOPHEN 1 TABLET: 10; 325 TABLET ORAL at 19:23

## 2020-08-13 RX ADMIN — HYDROCODONE BITARTRATE AND ACETAMINOPHEN 1 TABLET: 10; 325 TABLET ORAL at 23:58

## 2020-08-13 RX ADMIN — HYDROCODONE BITARTRATE AND ACETAMINOPHEN 1 TABLET: 10; 325 TABLET ORAL at 15:27

## 2020-08-13 RX ADMIN — HYDROCODONE BITARTRATE AND ACETAMINOPHEN 1 TABLET: 10; 325 TABLET ORAL at 11:22

## 2020-08-13 RX ADMIN — HYDROCODONE BITARTRATE AND ACETAMINOPHEN 1 TABLET: 10; 325 TABLET ORAL at 07:22

## 2020-08-13 RX ADMIN — DOCUSATE SODIUM 100 MG: 100 CAPSULE, LIQUID FILLED ORAL at 19:23

## 2020-08-13 RX ADMIN — SIMETHICONE 80 MG: 80 TABLET, CHEWABLE ORAL at 11:22

## 2020-08-13 RX ADMIN — IBUPROFEN 800 MG: 800 TABLET ORAL at 19:23

## 2020-08-13 RX ADMIN — IBUPROFEN 800 MG: 800 TABLET ORAL at 11:22

## 2020-08-13 RX ADMIN — SIMETHICONE 80 MG: 80 TABLET, CHEWABLE ORAL at 19:22

## 2020-08-13 RX ADMIN — DOCUSATE SODIUM 100 MG: 100 CAPSULE, LIQUID FILLED ORAL at 11:23

## 2020-08-13 RX ADMIN — SIMETHICONE 80 MG: 80 TABLET, CHEWABLE ORAL at 23:58

## 2020-08-13 RX ADMIN — IBUPROFEN 800 MG: 800 TABLET ORAL at 03:16

## 2020-08-13 RX ADMIN — HYDROCODONE BITARTRATE AND ACETAMINOPHEN 1 TABLET: 10; 325 TABLET ORAL at 03:16

## 2020-08-13 NOTE — ROUTINE PROCESS
Bedside and Verbal shift change report given to Jeremiah Davis RN (oncoming nurse) by Gladis Richards RN (offgoing nurse). Report included the following information SBAR.

## 2020-08-13 NOTE — PROGRESS NOTES
Bedside shift change report given to Pascual Soulier RN (oncoming nurse) by Deon Hale RN (offgoing nurse). Report included the following information SBAR, Kardex and MAR.

## 2020-08-14 VITALS
RESPIRATION RATE: 17 BRPM | OXYGEN SATURATION: 96 % | DIASTOLIC BLOOD PRESSURE: 66 MMHG | TEMPERATURE: 98.3 F | HEART RATE: 56 BPM | SYSTOLIC BLOOD PRESSURE: 101 MMHG

## 2020-08-14 PROCEDURE — 74011250637 HC RX REV CODE- 250/637: Performed by: OBSTETRICS & GYNECOLOGY

## 2020-08-14 RX ADMIN — DOCUSATE SODIUM 100 MG: 100 CAPSULE, LIQUID FILLED ORAL at 08:04

## 2020-08-14 RX ADMIN — SIMETHICONE 80 MG: 80 TABLET, CHEWABLE ORAL at 08:04

## 2020-08-14 RX ADMIN — HYDROCODONE BITARTRATE AND ACETAMINOPHEN 1 TABLET: 10; 325 TABLET ORAL at 08:04

## 2020-08-14 RX ADMIN — IBUPROFEN 800 MG: 800 TABLET ORAL at 04:00

## 2020-08-14 RX ADMIN — HYDROCODONE BITARTRATE AND ACETAMINOPHEN 1 TABLET: 10; 325 TABLET ORAL at 04:00

## 2020-08-14 NOTE — PROGRESS NOTES
Post-Operative  Day 3    [place]         Information for the patient's :  aPdmini Martin Female Milly Massey [[id number]]   , Low Transverse    Patient doing well without significant complaint. Tolerating diet, passing flatus, voiding and ambulating without difficulty    Vitals:  Visit Vitals  /66   Pulse (!) 56   Temp 98.3 °F (36.8 °C)   Resp 17   LMP 2019 (Approximate)   SpO2 96%   Breastfeeding Unknown     Temp (24hrs), Av.2 °F (36.8 °C), Min:98.2 °F (36.8 °C), Max:98.3 °F (36.8 °C)        Exam:        Patient without distress. Abdomen, bowel sounds present, soft, expected tenderness, fundus firm                Wound incision clean, dry and intact               Lower extremities are negative for swelling, cords or tenderness.     Labs:   Lab Results   Component Value Date/Time    WBC 10.0 2020 01:08 AM    WBC 9.4 2020 06:07 AM    WBC 9.0 2020 10:21 AM    WBC 8.4 2020 10:19 AM    WBC CANCELED 2019 09:02 AM    WBC 5.6 2019 09:02 AM    HGB 9.9 (L) 2020 01:08 AM    HGB 11.7 2020 06:07 AM    HGB 11.4 (L) 2020 10:21 AM    HGB 10.0 (L) 2020 10:19 AM    HGB CANCELED 2019 09:02 AM    HGB 11.6 2019 09:02 AM    HCT 30.2 (L) 2020 01:08 AM    HCT 34.7 (L) 2020 06:07 AM    HCT 34.6 (L) 2020 10:21 AM    HCT 30.5 (L) 2020 10:19 AM    HCT CANCELED 2019 09:02 AM    HCT 33.7 (L) 2019 09:02 AM    PLATELET 444  01:08 AM    PLATELET 863  06:07 AM    PLATELET 119  10:21 AM    PLATELET 169  10:19 AM    PLATELET CANCELED  09:02 AM    PLATELET 454  09:02 AM    Hgb, External 10.0 2020    Hgb, External 11.6 2019    Hct, External 30.5 2020    Hct, External 33.7 2019    Platelet cnt., External 236 2020    Platelet cnt., External 236 2019       No results found for this or any previous visit (from the past 24 hour(s)). Assessment: Post-Op day 3, doing well    Plan:   1. Discharge home today  2. Follow up in office in 6 weeks with Samir White MD  3. Post partum activity/wound care advised, diet as tolerated  4.  Discharge Medications: ibuprofen, norco and medications prior to admission

## 2020-08-14 NOTE — ROUTINE PROCESS
Bedside and Verbal shift change report given to Duncan Ware RN (oncoming nurse) by Bharati Mcmanus RN (offgoing nurse). Report included the following information SBAR, Kardex, Intake/Output and MAR.

## 2020-08-14 NOTE — DISCHARGE SUMMARY
Obstetrical Discharge Summary     Name: Ashley Guillory MRN: [id number]  SSN: [id number]    YOB: 1995  Age: 22 y.o. Sex: female      Admit Date: 2020    Discharge Date: 2020     Admitting Physician: Ayanna Ugalde MD     Attending Physician:  Ashu Staley MD     Admission Diagnoses:  delivery indicated due to breech presentation [O32.1XX0]; Pregnancy [Z34.90]    Discharge Diagnoses:   Information for the patient's :  Chris Keny Female Chela Coronado [[id number]]   Delivery of a 6 lb 14.8 oz (3.14 kg) female infant via , Low Transverse on 2020 at 8:29 AM  by Ayanna Ugalde. Apgars were 9  and 9 . Additional Diagnoses:   Hospital Problems  Date Reviewed: 2020          Codes Class Noted POA    Pregnancy ICD-10-CM: Z34.90  ICD-9-CM: V22.2  2020 Unknown             Lab Results   Component Value Date/Time    Rubella, External Immune 2019    GrBStrep, External Positive 2020       Hospital Course: Normal hospital course following the delivery. Disposition: Home  Condition: Good    Patient Instructions:   Current Discharge Medication List      CONTINUE these medications which have NOT CHANGED    Details   psyllium (METAMUCIL) packet Take 1 Packet by mouth daily. prenatal 91/IYSZ fum/folic/dha (PRENATAL-1 PO) Take  by mouth. ferrous sulfate (Iron) 325 mg (65 mg iron) tablet Take 325 mg by mouth Daily (before breakfast). promethazine (PHENERGAN) 25 mg tablet Take one po every 8 hours prn nausea  Qty: 30 Tab, Refills: 1             Reference my discharge instructions. No orders of the defined types were placed in this encounter.        Signed By:  Ayanna Ugalde MD     2020

## 2020-08-14 NOTE — PROGRESS NOTES
Post-Operative Day Number 2 Progress Note    Patient doing well post-op day 2 from  delivery without significant complaints. Pain controlled on current medication. Voiding without difficulty, normal lochia. Vitals:    Patient Vitals for the past 8 hrs:   BP Temp Pulse Resp   20 1519 113/54 98.2 °F (36.8 °C) (!) 55 14     Temp (24hrs), Av.1 °F (36.7 °C), Min:97.8 °F (36.6 °C), Max:98.2 °F (36.8 °C)      Vital signs stable, afebrile. Exam:  Patient without distress. Abdomen soft, fundus firm at level of umbilicus, nontender. Incision dry and clean without erythema. Lower extremities are negative for swelling, cords or tenderness. Labs: No results found for this or any previous visit (from the past 24 hour(s)). Assessment and Plan:  Patient appears to be having uncomplicated post- course. Continue routine post-op care and maternal education.

## 2020-08-14 NOTE — DISCHARGE INSTRUCTIONS
POST DELIVERY DISCHARGE INSTRUCTIONS    Name: Niko Hardwick  YOB: 1995  Primary Diagnosis: Active Problems:    Pregnancy (2020)        General:     Diet/Diet Restrictions:  Eight 8-ounce glasses of fluid daily (water, juices); avoid excessive caffeine intake. Meals/snacks as desired which are high in fiber and carbohydrates and low in fat and cholesterol. Medications:   {Medication reconciliation information is now added to the patient's AVS automatically when it is printed. There is no need to use this SmartLink in discharge instructions. Highlight this text and delete it to clear this message}      Physical Activity / Restrictions / Safety:     Avoid heavy lifting, no more that 8 lbs. For 2-3 weeks; No driving while taking narcotic pain medication. Post  patients should not drive until pain free. No intercourse 4-6 weeks, no douching or tampon use. May resume exercise in 6 weeks. Discharge Instructions/Special Treatment/Home Care Needs:     Continue prenatal vitamins. Continue to use squirt bottle with warm water on your episiotomy after each bathroom use until bleeding stops. If steri-strips applied to your incision, remove in 7 days. Take stool softeners daily. Call your doctor for the following:     Fever over 101 degrees by mouth. Vaginal bleeding heavier than a normal menstrual period or lost larger than a golf ball. Red streaks or increased swelling of legs, painful red streaks on your breast.  Painful urination, or increased pain, redness or discharge with your incision. Pain Management:     Pain Management:   Take Acetaminophen (Tylenol) or Ibuprofen (Advil, Motrin), as directed for pain. Use a warm Sitz bath 3 times daily to relieve episiotomy or hemorrhoidal discomfort. Heating pad to  incision as needed. For hemorrhoidal discomfort, use Tucks and Anusol cream as needed and directed.     Follow-Up Care:     Appointment with MD: No orders of the defined types were placed in this encounter.     Telephone number: [phone]    Signed By: Dleia Juarez MD                                                                                                   Date: 8/13/2020 Time: 10:25 PM

## 2020-08-23 NOTE — PROGRESS NOTES
Problem List  Date Reviewed: 5/27/2020          Codes Class Noted    Supervision of normal first pregnancy, antepartum ICD-10-CM: Z34.00  ICD-9-CM: V22.0  1/3/2020    Overview Addendum 6/1/2020 10:04 AM by Bradley Quiros LPN     Intrauterine pregnancy with the following problems identified:   [facility] 8/17/2029 D=US.   Flu vaccine 12/23/2019  Horizon- Negative  NIPTS- normal female- Pt notified  AFP- Positive- M on 3/6  Fu US at 28 weeks with glucola in our office 5/27  Weekly testing at [facility] 36-38 weeks  Anemia 10.0  abnl glucola - - -

## 2020-09-24 NOTE — PROGRESS NOTES
Niko Hardwick, who was evaluated through a synchronous (real-time) audio-video encounter, and/or her healthcare decision maker, is aware that it is a billable service, with coverage as determined by her insurance carrier. She provided verbal consent to proceed: Yes, and patient identification was verified. It was conducted pursuant to the emergency declaration under the [facility], [address] [place] [address] and the [employer] Act. A caregiver was present when appropriate. Ability to conduct physical exam was limited. I was in the office. The patient was at home. Postpartum evaluation    Niko Hardwick is a 22 y.o. female who presents for a postpartum exam.     She is now six weeks post primary  section. 2020    Her baby is doing well. She has had no menses since delivery. She has had the following significant problems since her delivery: none    The patient is bottle feeding without difficulty. The patient would like to use the patch for birth control. She is currently taking: no medications. She is due for her next AE in 2021      There were no vitals taken for this visit. Objective:     General: alert, cooperative, no distress   Mental  status: mental status: alert, oriented to person, place, and time, normal mood, behavior, speech, dress, motor activity, and thought processes   Resp: resp: normal effort and no respiratory distress   Neuro: neuro: no gross deficits   Skin: skin: no discoloration or lesions of concern on visible areas   Due to this being a TeleHealth evaluation, many elements of the physical examination are unable to be assessed. Assessment:  Normal postpartum check    Plan:  RTO for AE. Start Evra          We discussed the expected course, resolution and complications of the diagnosis(es) in detail.   Medication risks, benefits, costs, interactions, and alternatives were discussed as indicated. I advised her to contact the office if her condition worsens, changes or fails to improve as anticipated. She expressed understanding with the diagnosis(es) and plan. Eric Peacock is a 22 y.o. female who was evaluated by a video visit encounter for concerns as above. Patient identification was verified prior to start of the visit. A caregiver was present when appropriate. Due to this being a TeleHealth encounter (During OTTQA-62 public health emergency), evaluation of the following organ systems was limited: Vitals/Constitutional/EENT/Resp/CV/GI//MS/Neuro/Skin/Heme-Lymph-Imm. Pursuant to the emergency declaration under the [facility], 1135 waiver authority and the [employer] and [employer] Act, this Virtual  Visit was conducted, with patient's (and/or legal guardian's) consent, to reduce the patient's risk of exposure to COVID-19 and provide necessary medical care. Services were provided through a video synchronous discussion virtually to substitute for in-person clinic visit. Patient and provider were located at their individual homes.       Yusuf Sauer MD

## 2020-09-25 ENCOUNTER — VIRTUAL VISIT (OUTPATIENT)
Dept: OBGYN CLINIC | Age: 25
End: 2020-09-25
Payer: OTHER GOVERNMENT

## 2020-09-25 PROCEDURE — 0503F POSTPARTUM CARE VISIT: CPT | Performed by: OBSTETRICS & GYNECOLOGY

## 2020-10-16 ENCOUNTER — APPOINTMENT (OUTPATIENT)
Dept: GENERAL RADIOLOGY | Age: 25
End: 2020-10-16
Attending: EMERGENCY MEDICINE
Payer: OTHER GOVERNMENT

## 2020-10-16 ENCOUNTER — HOSPITAL ENCOUNTER (EMERGENCY)
Age: 25
Discharge: HOME | End: 2020-10-16
Attending: EMERGENCY MEDICINE | Admitting: EMERGENCY MEDICINE
Payer: OTHER GOVERNMENT

## 2020-10-16 VITALS
DIASTOLIC BLOOD PRESSURE: 71 MMHG | RESPIRATION RATE: 20 BRPM | WEIGHT: 126.1 LBS | HEIGHT: 65 IN | HEART RATE: 87 BPM | BODY MASS INDEX: 21.01 KG/M2 | OXYGEN SATURATION: 97 % | TEMPERATURE: 99 F | SYSTOLIC BLOOD PRESSURE: 109 MMHG

## 2020-10-16 DIAGNOSIS — B34.9 VIRAL ILLNESS: Primary | ICD-10-CM

## 2020-10-16 LAB
ALBUMIN SERPL-MCNC: 4.2 G/DL (ref 3.5–5)
ALBUMIN/GLOB SERPL: 1.2 {RATIO} (ref 1.1–2.2)
ALP SERPL-CCNC: 83 U/L (ref 45–117)
ALT SERPL-CCNC: 20 U/L (ref 12–78)
ANION GAP SERPL CALC-SCNC: 7 MMOL/L (ref 5–15)
APPEARANCE UR: CLEAR
AST SERPL-CCNC: 16 U/L (ref 15–37)
BACTERIA URNS QL MICRO: NEGATIVE /HPF
BASOPHILS # BLD: 0.2 K/UL (ref 0–0.1)
BASOPHILS NFR BLD: 2 % (ref 0–1)
BILIRUB SERPL-MCNC: 0.5 MG/DL (ref 0.2–1)
BILIRUB UR QL: NEGATIVE
BUN SERPL-MCNC: 15 MG/DL (ref 6–20)
BUN/CREAT SERPL: 17 (ref 12–20)
CALCIUM SERPL-MCNC: 9.4 MG/DL (ref 8.5–10.1)
CHLORIDE SERPL-SCNC: 106 MMOL/L (ref 97–108)
CO2 SERPL-SCNC: 25 MMOL/L (ref 21–32)
COLOR UR: NORMAL
COMMENT, HOLDF: NORMAL
CREAT SERPL-MCNC: 0.9 MG/DL (ref 0.55–1.02)
DIFFERENTIAL METHOD BLD: ABNORMAL
EOSINOPHIL # BLD: 0 K/UL (ref 0–0.4)
EOSINOPHIL NFR BLD: 0 % (ref 0–7)
EPITH CASTS URNS QL MICRO: NORMAL /LPF
ERYTHROCYTE [DISTWIDTH] IN BLOOD BY AUTOMATED COUNT: 11.7 % (ref 11.5–14.5)
GLOBULIN SER CALC-MCNC: 3.6 G/DL (ref 2–4)
GLUCOSE SERPL-MCNC: 112 MG/DL (ref 65–100)
GLUCOSE UR STRIP.AUTO-MCNC: NEGATIVE MG/DL
HCT VFR BLD AUTO: 38.7 % (ref 35–47)
HGB BLD-MCNC: 13 G/DL (ref 11.5–16)
HGB UR QL STRIP: NEGATIVE
HYALINE CASTS URNS QL MICRO: NORMAL /LPF (ref 0–5)
IMM GRANULOCYTES # BLD AUTO: 0 K/UL
IMM GRANULOCYTES NFR BLD AUTO: 0 %
KETONES UR QL STRIP.AUTO: NEGATIVE MG/DL
LACTATE BLD-SCNC: 0.8 MMOL/L (ref 0.4–2)
LEUKOCYTE ESTERASE UR QL STRIP.AUTO: NEGATIVE
LYMPHOCYTES # BLD: 1 K/UL (ref 0.8–3.5)
LYMPHOCYTES NFR BLD: 11 % (ref 12–49)
MCH RBC QN AUTO: 31.1 PG (ref 26–34)
MCHC RBC AUTO-ENTMCNC: 33.6 G/DL (ref 30–36.5)
MCV RBC AUTO: 92.6 FL (ref 80–99)
MONOCYTES # BLD: 0.5 K/UL (ref 0–1)
MONOCYTES NFR BLD: 6 % (ref 5–13)
NEUTS BAND NFR BLD MANUAL: 20 % (ref 0–6)
NEUTS SEG # BLD: 7.4 K/UL (ref 1.8–8)
NEUTS SEG NFR BLD: 61 % (ref 32–75)
NITRITE UR QL STRIP.AUTO: NEGATIVE
NRBC # BLD: 0 K/UL (ref 0–0.01)
NRBC BLD-RTO: 0 PER 100 WBC
PH UR STRIP: 6.5 [PH] (ref 5–8)
PLATELET # BLD AUTO: 223 K/UL (ref 150–400)
PMV BLD AUTO: 9.6 FL (ref 8.9–12.9)
POTASSIUM SERPL-SCNC: 3.4 MMOL/L (ref 3.5–5.1)
PROT SERPL-MCNC: 7.8 G/DL (ref 6.4–8.2)
PROT UR STRIP-MCNC: NEGATIVE MG/DL
RBC # BLD AUTO: 4.18 M/UL (ref 3.8–5.2)
RBC #/AREA URNS HPF: NORMAL /HPF (ref 0–5)
RBC MORPH BLD: ABNORMAL
SAMPLES BEING HELD,HOLD: NORMAL
SODIUM SERPL-SCNC: 138 MMOL/L (ref 136–145)
SP GR UR REFRACTOMETRY: 1.01 (ref 1–1.03)
UROBILINOGEN UR QL STRIP.AUTO: 0.2 EU/DL (ref 0.2–1)
WBC # BLD AUTO: 9.1 K/UL (ref 3.6–11)
WBC URNS QL MICRO: NORMAL /HPF (ref 0–4)

## 2020-10-16 PROCEDURE — 36415 COLL VENOUS BLD VENIPUNCTURE: CPT

## 2020-10-16 PROCEDURE — 83605 ASSAY OF LACTIC ACID: CPT

## 2020-10-16 PROCEDURE — 96374 THER/PROPH/DIAG INJ IV PUSH: CPT

## 2020-10-16 PROCEDURE — 81001 URINALYSIS AUTO W/SCOPE: CPT

## 2020-10-16 PROCEDURE — 85025 COMPLETE CBC W/AUTO DIFF WBC: CPT

## 2020-10-16 PROCEDURE — 74011250637 HC RX REV CODE- 250/637: Performed by: EMERGENCY MEDICINE

## 2020-10-16 PROCEDURE — 80053 COMPREHEN METABOLIC PANEL: CPT

## 2020-10-16 PROCEDURE — 74011250636 HC RX REV CODE- 250/636: Performed by: EMERGENCY MEDICINE

## 2020-10-16 PROCEDURE — 87635 SARS-COV-2 COVID-19 AMP PRB: CPT

## 2020-10-16 PROCEDURE — 99283 EMERGENCY DEPT VISIT LOW MDM: CPT

## 2020-10-16 PROCEDURE — 71045 X-RAY EXAM CHEST 1 VIEW: CPT

## 2020-10-16 RX ORDER — KETOROLAC TROMETHAMINE 30 MG/ML
30 INJECTION, SOLUTION INTRAMUSCULAR; INTRAVENOUS ONCE
Status: COMPLETED | OUTPATIENT
Start: 2020-10-16 | End: 2020-10-16

## 2020-10-16 RX ORDER — ACETAMINOPHEN 500 MG
1000 TABLET ORAL ONCE
Status: COMPLETED | OUTPATIENT
Start: 2020-10-16 | End: 2020-10-16

## 2020-10-16 RX ADMIN — KETOROLAC TROMETHAMINE 30 MG: 30 INJECTION, SOLUTION INTRAMUSCULAR at 20:53

## 2020-10-16 RX ADMIN — ACETAMINOPHEN 1000 MG: 500 TABLET ORAL at 20:28

## 2020-10-16 RX ADMIN — SODIUM CHLORIDE 1000 ML: 900 INJECTION, SOLUTION INTRAVENOUS at 20:52

## 2020-10-16 NOTE — ED TRIAGE NOTES
Pt ambulatory to triage with mask, c/o fever, chills, generalized body aches, and headache started around 1800 today. T 102.9 and  in triage. Pt had C section back in August. Denies CP or SOB.

## 2020-10-16 NOTE — LETTER
[facility] 
[facility] EMERGENCY DEPT 
[address] [place]  [address] [phone] Work/School Note Date: 10/16/2020 To Whom It May concern: 
 
Komal De León was evaulated by the following provider(s): 
Attending Provider: Shayy Lopez MD.   [address] virus is suspected. Per the CDC guidelines we recommend home isolation until the following conditions are all met: 1. At least 10 days have passed since symptoms first appeared and 2. At least 24 hours have passed since last fever without the use of fever-reducing medications and 
3. Symptoms (e.g., cough, shortness of breath) have improved Sincerely, Frida Onofre MD

## 2020-10-16 NOTE — LETTER
[place] 
[facility] EMERGENCY DEPT 
[address] [address] 
[phone] Work/School Note Date: 10/16/2020 To Whom It May concern: 
 
Cirilo Castrejon was seen and treated today in the emergency room by the following provider(s): 
Attending Provider: Roger Mckenzie MD. Cirilo Castrejon may return to work on 10/20/2020.  
 
Sincerely, 
 
 
 
 
David Sharma RN

## 2020-10-17 ENCOUNTER — PATIENT OUTREACH (OUTPATIENT)
Dept: CASE MANAGEMENT | Age: 25
End: 2020-10-17

## 2020-10-17 NOTE — DISCHARGE INSTRUCTIONS
Your physician or healthcare provider has determined that you are at risk for the Coronavirus (COVID-19). If you have met CDC criteria, your physician may have sent a laboratory test.  Please adhere to the following restrictions until you obtain your results or are cleared by your primary care doctor:    Stay at home except to get medical care. Seek medical attention if you develop worsening symptoms or new concerns such as severe shortness of breath, chest pain, etc.    Separate yourself from other people and animals in your home. If possible, stay in a separate room and use a separate bathroom from others in your house. Restrict contact with pets, as there is a possibility of transmission of the virus. Call your doctor before showing up at their office. Let them know you have or may have COVID-19    Wear a facemask when you are around other people. Cover your mouth when you cough or sneeze. Wash your hands often with warm soapy water for at least 20 seconds. If soap and water are not available, use an alcohol based hand . Clean all high touch surfaces everyday. For example: counters, tabletops, doorknobs, bathroom fixtures, toilets, phones, keyboards, tablets, and bedside tables. Monitor your symptoms at home. Seek prompt medical attention if you symptoms worsen. (i.e. difficulty breathing). Call your healthcare provider before coming and tell them you may have COVID-19. Wear a mask upon entering the facility. Stay at home until all these things have happened:   You have had no fevers for at least 24 hours (without fever reducing meds)  AND  Your other symptoms have improved  (e.g. cough, shortness of breath) AND  At least 10 days have passed since the beginning of your symptoms      Source: CDC website ([employer])      If you have further questions about the Coronavirus (COVID-19), please contact the [place] Department of Health COVID-19 Hotline at 6-896.332.7429, the [address] [place] [address] Drive at [phone] or [facility] [phone].

## 2020-10-17 NOTE — ED PROVIDER NOTES
Is a 80-year-old female with history of HPV, who presents with fevers, myalgias that began this afternoon. Patient reports she works at a [employer], but denies any known sick contacts. Reports that her temperature was high as 103 F at home prompting ED visit. Denies any cough, shortness of breath, nasal congestion. Past Medical History:   Diagnosis Date    Acne     HPV vaccine counseling     Pt completed Gardasil series 10/2013       Past Surgical History:   Procedure Laterality Date    HX HEENT      Cyst removed     HX OTHER SURGICAL      wisdoms teeth, cyst above eye removed as child         No family history on file.     Social History     Socioeconomic History    Marital status:      Spouse name: Angelica Dawson Number of children: Not on file    Years of education: Not on file    Highest education level: Not on file   Occupational History    Not on file   Social Needs    Financial resource strain: Not on file    Food insecurity     Worry: Not on file     Inability: Not on file    Transportation needs     Medical: Not on file     Non-medical: Not on file   Tobacco Use    Smoking status: Never Smoker    Smokeless tobacco: Never Used   Substance and Sexual Activity    Alcohol use: No    Drug use: Never    Sexual activity: Yes     Partners: Male     Birth control/protection: None   Lifestyle    Physical activity     Days per week: Not on file     Minutes per session: Not on file    Stress: Not on file   Relationships    Social connections     Talks on phone: Not on file     Gets together: Not on file     Attends Yarsani service: Not on file     Active member of club or organization: Not on file     Attends meetings of clubs or organizations: Not on file     Relationship status: Not on file    Intimate partner violence     Fear of current or ex partner: Not on file     Emotionally abused: Not on file     Physically abused: Not on file     Forced sexual activity: Not on file Other Topics Concern     Service Not Asked    Blood Transfusions Not Asked    Caffeine Concern Not Asked    Occupational Exposure Not Asked    Hobby Hazards Not Asked    Sleep Concern Not Asked    Stress Concern Not Asked    Weight Concern Not Asked    Special Diet Not Asked    Back Care Not Asked    Exercise Not Asked    Bike Helmet Not Asked   [address] Not Asked    Self-Exams Not Asked   Social History Narrative    Not on file         ALLERGIES: Patient has no known allergies. Review of Systems   Constitutional: Positive for fatigue and fever. Negative for chills. HENT: Negative for drooling and nosebleeds. Eyes: Negative for pain and itching. Respiratory: Negative for cough, choking, shortness of breath, wheezing and stridor. Cardiovascular: Negative for leg swelling. Gastrointestinal: Negative for abdominal distention and rectal pain. Endocrine: Negative for heat intolerance and polyphagia. Genitourinary: Negative for enuresis and genital sores. Musculoskeletal: Positive for myalgias. Negative for arthralgias and joint swelling. Skin: Negative for color change. Allergic/Immunologic: Negative for immunocompromised state. Neurological: Negative for tremors and speech difficulty. Hematological: Negative for adenopathy. Psychiatric/Behavioral: Negative for dysphoric mood and sleep disturbance. Vitals:    10/16/20 1944 10/16/20 2053 10/16/20 2236   BP: 109/71     Pulse: (!) 120 87    Resp: 20     Temp: (!) 102.9 °F (39.4 °C)  99 °F (37.2 °C)   SpO2: 98% 97%    Weight: 57.2 kg (126 lb 1.7 oz)     Height: 5' 5\" (1.651 m)              Physical Exam  Vitals signs and nursing note reviewed. Constitutional:       General: She is not in acute distress. Appearance: She is well-developed. She is not ill-appearing, toxic-appearing or diaphoretic. HENT:      Head: Normocephalic and atraumatic.       Nose: Nose normal.      Mouth/Throat:      Mouth: Mucous membranes are moist.   Eyes:      Conjunctiva/sclera: Conjunctivae normal.   Neck:      Musculoskeletal: Normal range of motion and neck supple. Cardiovascular:      Rate and Rhythm: Regular rhythm. Tachycardia present. Heart sounds: Normal heart sounds. Pulmonary:      Effort: Pulmonary effort is normal. No respiratory distress. Breath sounds: Normal breath sounds. Abdominal:      General: There is no distension. Palpations: Abdomen is soft. Musculoskeletal: Normal range of motion. General: No deformity. Skin:     General: Skin is warm and dry. Neurological:      Mental Status: She is alert and oriented to person, place, and time. Coordination: Coordination normal.   Psychiatric:         Mood and Affect: Mood normal.         Behavior: Behavior normal.          MDM  Number of Diagnoses or Management Options  Viral illness:   Diagnosis management comments: Nontoxic-appearing. Reports fevers as high as 103 started this evening. Works at [employer] but no known sick contacts. Denies chest pain, shortness of breath, cough. Relief with meds and IV fluids in ED. Swab for Covid on the way out. Counseled on quarantining until results are back. Strict return to ER precautions provided. She verbalized understanding of discharge bedside all today. Stable for discharge. Procedures    Patient's results have been reviewed with them. Patient and/or family have verbally conveyed their understanding and agreement of the patient's signs, symptoms, diagnosis, treatment and prognosis and additionally agree to follow up as recommended or return to the Emergency Room should their condition change prior to follow-up.   Discharge instructions have also been provided to the patient with some educational information regarding their diagnosis as well a list of reasons why they would want to return to the ER prior to their follow-up appointment should their condition change.

## 2020-10-19 LAB
COVID-19, XGCOVT: NOT DETECTED
HEALTH STATUS, XMCV2T: NORMAL
SOURCE, COVRS: NORMAL
SPECIMEN SOURCE, FCOV2M: NORMAL
SPECIMEN TYPE, XMCV1T: NORMAL

## 2020-10-20 ENCOUNTER — PATIENT OUTREACH (OUTPATIENT)
Dept: CASE MANAGEMENT | Age: 25
End: 2020-10-20

## 2020-10-30 ENCOUNTER — PATIENT OUTREACH (OUTPATIENT)
Dept: CASE MANAGEMENT | Age: 25
End: 2020-10-30

## 2020-10-30 NOTE — PROGRESS NOTES
Patient resolved from Transition of Care episode on 10/30/20. ACM/CTN was unsuccessful at contacting this patient today. Patient/family was provided the following resources and education related to COVID-19 during the initial call:                         Signs, symptoms and red flags related to COVID-19            CDC exposure and quarantine guidelines            Conduit exposure contact - [phone]            Contact for their local Department of Health                 Patient has not had any additional ED or hospital visits. No further outreach scheduled with this CTN/ACM. Episode of Care resolved. Patient has this CTN/ACM contact information if future needs arise.
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
## 2020-12-17 PROBLEM — Z34.90 PREGNANCY: Status: RESOLVED | Noted: 2020-08-11 | Resolved: 2020-12-17

## 2020-12-17 PROBLEM — Z34.00 SUPERVISION OF NORMAL FIRST PREGNANCY, ANTEPARTUM: Status: RESOLVED | Noted: 2020-01-03 | Resolved: 2020-12-17

## 2020-12-17 RX ORDER — ETONOGESTREL AND ETHINYL ESTRADIOL 11.7; 2.7 MG/1; MG/1
INSERT, EXTENDED RELEASE VAGINAL
Qty: 1 DEVICE | Refills: 0 | Status: SHIPPED | OUTPATIENT
Start: 2020-12-17 | End: 2022-04-15

## 2022-01-13 ENCOUNTER — TELEPHONE (OUTPATIENT)
Dept: FAMILY MEDICINE CLINIC | Age: 27
End: 2022-01-13

## 2022-01-13 NOTE — TELEPHONE ENCOUNTER
----- Message from Mike Wayne sent at 1/13/2022  3:28 PM EST -----  Subject: Appointment Request    Reason for Call: Routine Physical Exam    QUESTIONS  Type of Appointment? New Patient/New to Provider  Reason for appointment request? Available appointments did not meet   patient need  Additional Information for Provider? Pt would like to establish & have   physical/bloodwork w/Dr Jacque Gandhi - wants in person visit; no appts   available; screened green  ---------------------------------------------------------------------------  --------------  CALL BACK INFO  What is the best way for the office to contact you? OK to leave message on   voicemail  Preferred Call Back Phone Number? [phone]  ---------------------------------------------------------------------------  --------------  SCRIPT ANSWERS  Relationship to Patient? Self  Specialty Confirmation? Primary Care  (If the patient has Medicare as their primary insurance coverage ask this   question) Are you requesting a Medicare Annual Wellness Visit? No  (Is the patient requesting a pap smear with their physical exam?)? No  (Is the patient requesting their annual physical and does not need PAP or   AWV per above?)? Yes   Have you been diagnosed with, awaiting test results for, or told that you   are suspected of having COVID-19 (Coronavirus)? (If patient has tested   negative or was tested as a requirement for work, school, or travel and   not based on symptoms, answer no)? No  Within the past two weeks have you developed any of the following symptoms   (answer no if symptoms have been present longer than 2 weeks or began   more than 2 weeks ago)? Fever or Chills, Cough, Shortness of breath or   difficulty breathing, Loss of taste or smell, Sore throat, Nasal   congestion, Sneezing or runny nose, Fatigue or generalized body aches   (answer no if pain is specific to a body part e.g. back pain), Diarrhea,   Headache?  No  Have you had close contact with someone with COVID-19 in the last 14 days? No  (Service Expert  click yes below to proceed with [employer] As Usual   Scheduling)?  Yes

## 2022-01-13 NOTE — TELEPHONE ENCOUNTER
Returned pt's call. Advised I was returning her call to schedule her with Dr. Zacarias Liu. Did advised new patient, in office, would probably be in April 2022, but she can call back to schedule.

## 2022-03-02 LAB
CREATININE, EXTERNAL: 0.55
LDL-C, EXTERNAL: 111

## 2022-04-15 ENCOUNTER — OFFICE VISIT (OUTPATIENT)
Dept: FAMILY MEDICINE CLINIC | Age: 27
End: 2022-04-15
Payer: OTHER GOVERNMENT

## 2022-04-15 VITALS
OXYGEN SATURATION: 98 % | WEIGHT: 129 LBS | SYSTOLIC BLOOD PRESSURE: 111 MMHG | HEIGHT: 65 IN | BODY MASS INDEX: 21.49 KG/M2 | HEART RATE: 59 BPM | DIASTOLIC BLOOD PRESSURE: 59 MMHG | RESPIRATION RATE: 20 BRPM | TEMPERATURE: 97.7 F

## 2022-04-15 DIAGNOSIS — Z01.411 ENCOUNTER FOR WELL WOMAN EXAM WITH ABNORMAL FINDINGS: ICD-10-CM

## 2022-04-15 DIAGNOSIS — F41.9 ANXIETY: Primary | ICD-10-CM

## 2022-04-15 PROCEDURE — 99214 OFFICE O/P EST MOD 30 MIN: CPT | Performed by: FAMILY MEDICINE

## 2022-04-15 PROCEDURE — 99385 PREV VISIT NEW AGE 18-39: CPT | Performed by: FAMILY MEDICINE

## 2022-04-15 RX ORDER — ESCITALOPRAM OXALATE 10 MG/1
10 TABLET ORAL DAILY
Qty: 30 TABLET | Refills: 0 | Status: SHIPPED | OUTPATIENT
Start: 2022-04-15 | End: 2022-05-12

## 2022-04-15 NOTE — PROGRESS NOTES
Chief Complaint   Patient presents with    Establish Care     CPE if possible     Anxiety     having issues foucusing at work     Fatigue     lack of energy

## 2022-04-15 NOTE — LETTER
4/15/2022 11:06 AM    Ms. Sanchez Shows  [address] [facility] [address]      [address]  [address] [address] [facility]  Phone: [phone]  Fax: [phone]    4/15/2022     aLura Shows   1995   [address] [address]      To Whom It May Concern,    The above-named patient is receiving medical care at [address]. Please fax a copy of the following document(s) for continuity of care. Fax number :  [phone]     - Labs from the last 1 year  - Last Physical  - Last Office Visit  - Shot Record  - Available Imaging Reports    We look forward to partnering with you to provide collaborative patient care. Please reach out to our office at [phone] if there are questions regarding this request.    Sincerely,    Genevieve Arriaga and Sky [address]

## 2022-04-15 NOTE — PROGRESS NOTES
Family Medicine Initial Office Visit  Patient: Shon Srivastava  1995, 32 y.o., female  Encounter Date: 4/15/2022    ASSESSMENT & PLAN    ICD-10-CM ICD-9-CM    1. Anxiety  F41.9 300.00 escitalopram oxalate (Lexapro) 10 mg tablet   2. Encounter for well woman exam with abnormal findings  Z01.411 V72.31      Orders Placed This Encounter    escitalopram oxalate (Lexapro) 10 mg tablet     Sig: Take 1 Tablet by mouth daily. Dispense:  30 Tablet     Refill:  0     Patient Instructions   Joshua Inman here today to estb care  Recommend we get records from prior pcp--reports recently normal labs  i've asked for them to be sent ot me  I agree neuropsych testing is a good idea, info below  I recommend counseling  I recommend for anxiety trial lexapro  Consider adding wellbutrin on top for attention / focus  May need eval for ADHD as above, neurpsych  Healthy lifestyle habits recommended  F/u 1m or sooner if needed    For Psychology/Psychiatry, can call:  [address]  [address] [place], [place] [phone]    [address]  [address] [facility]   [phone] OR [facility] [phone] Sandra Malone  PHONE [phone]    [facility]  Crisis Intervention   call anytime  [phone]    [facility]  Address  [address]    Phone: [phone]  Fax: [phone]    Hours  Monday 8 a.m. - 5 p.m. Tuesday - Thursday 8 a.m. - 9 p.m. Friday 8 a.m. - 5 p.m.     FOR TESTING:  Blanche Luna [address] [facility]  Total time on the date of encounter exceeded 45 minutes and included patient care, coordination of care, charting and preparation for visit. CHIEF COMPLAINT  Chief Complaint   Patient presents with    Establish Care     CPE if possible     Anxiety     having issues foucusing at work     Fatigue     lack of energy        [address] is a 32 y.o. female presenting today for establishing care.    Patient works as Pharmacy Tech-->  Patient lives in a house with [facility], , daughter, brother in law--she's building a house  Patient was last seen by primary care El Men  Patient last saw a dentist Goes Regularly  Patient last had eye exam Goes regularly ([facility])    Exercise: not that much, has tried but falls off  Diet / Fermin Lucas moderately healthy  Weight is about stable    Tobacco:no  EtOH:rarely  Illicit Substances:no    Sexual Activity:yes one male partner  No contraception  Baby is 2 right now  Has low sex drive right now  Thought that might have been birth control, had a lot of nausea with the patch  Didn't want to try anything else at the time  Going off contraception did not help  Her cycles are \"more spotty\" and \"hit or miss\"     FATIGUE and Orase 98 PCP  Checked iron, tsh and was overall ok  Labs from march    Tried buspar in the past, 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, she felt slowed down, sluggish, in her own world  Got switched to [address] [place] [address] Road  Just tried wellbutrin, didn't take it consistently    She is bothered at work--she finds that she is flushing at work in public settings, she'll blank when she is supposed to be speaking, she'll get shakey, she'll get a rash on her chest, she doesn't like public speaking    She is feeling over stimulated with noise, needing to be needed  Tried exercise, poor motivation d/t time constraints and exercise    She tells me Dad is bipolar, has adhd, insomnia, ocd    She tried going to virtual therapy  She tells me that she was able to get out some of her things  But she didn't get any tools moving forward    CHRISTINA 2/7 4/15/2022   Feeling nervous, anxious or on edge? 3   Not being able to stop or control worrying? 3   CHRISTINA-2 Subtotal 6   Worrying too much about different things? 3   Trouble relaxing? 3   Being so restless that it is hard to sit still? 3   Becoming easily annoyed or irritable? 3   Feeling afraid as if something awful might happen? 3   CHRISTINA-7 Total Score 21   CHRISTINA-7 Result Severe Anxiety   If you checked off any problems, how difficult have these problems made it for you to do your work, take care of thinks at home, or get along with other people? Somewhat difficult     Wonders if she has fibromyalgia  She described brain fog  Always doubting herself, has trouble with recall / memory    Review of Systems  A 12 point review of systems was negative except as noted here or in the HPI. OBJECTIVE  Visit Vitals  BP (!) 111/59   Pulse (!) 59   Temp 97.7 °F (36.5 °C) (Temporal)   Resp 20   Ht 5' 5\" (1.651 m)   Wt 129 lb (58.5 kg)   SpO2 98%   BMI 21.47 kg/m²       Physical Exam  Vitals and nursing note reviewed. Constitutional:       General: She is not in acute distress. Appearance: Normal appearance. She is normal weight. She is not ill-appearing, toxic-appearing or diaphoretic. HENT:      Head: Normocephalic and atraumatic. Right Ear: External ear normal.      Left Ear: External ear normal.      Mouth/Throat:      Mouth: Mucous membranes are moist.   Eyes:      General: No scleral icterus. Right eye: No discharge. Left eye: No discharge. Comments: eom grossly intact   Neck:      Comments: No visible neck masses , ROM appears normal from visual inspection  Cardiovascular:      Rate and Rhythm: Normal rate and regular rhythm. Pulses: Normal pulses. Heart sounds: Normal heart sounds. No murmur heard. No friction rub. No gallop. Pulmonary:      Effort: Pulmonary effort is normal. No respiratory distress. Breath sounds: Normal breath sounds. No stridor. No wheezing, rhonchi or rales. Abdominal:      General: Abdomen is flat. Bowel sounds are normal. There is no distension. Palpations: Abdomen is soft. There is no mass. Tenderness: There is no abdominal tenderness. Musculoskeletal:      Right lower leg: No edema. Left lower leg: No edema. Skin:     General: Skin is warm and dry. Comments: Visible skin is without jaundice, bruising, lesion, pallor, erythema or rash except as otherwise noted   Neurological:      General: No focal deficit present. Mental Status: She is alert and oriented to person, place, and time. Psychiatric:         Mood and Affect: Mood normal.         Behavior: Behavior normal.         Thought Content: Thought content normal.         Judgment: Judgment normal.         No results found for any visits on 04/15/22. HISTORICAL  Reviewed and updated today, and as noted below:    Past Medical History:   Diagnosis Date    Acne     HPV vaccine counseling     Pt completed Gardasil series 10/2013     Past Surgical History:   Procedure Laterality Date    HX HEENT      Cyst removed     HX OTHER SURGICAL      wisdoms teeth, cyst above eye removed as child     History reviewed. No pertinent family history. Social History     Tobacco Use   Smoking Status Never Smoker   Smokeless Tobacco Never Used     Social History     Socioeconomic History    Marital status:      Spouse name: Abdiel   Tobacco Use    Smoking status: Never Smoker    Smokeless tobacco: Never Used   Vaping Use    Vaping Use: Never used   Substance and Sexual Activity    Alcohol use: No    Drug use: Never    Sexual activity: Yes     Partners: Male     Birth control/protection: None     No Known Allergies    No visits with results within 3 Month(s) from this visit.    Latest known visit with results is:   Admission on 10/16/2020, Discharged on 10/16/2020   Component Date Value Ref Range Status    SAMPLES BEING HELD 10/16/2020 [email or web address] Corrected    CORRECTED ON 10/16 AT 2113: PREVIOUSLY REPORTED AS [email or web address], CORRECTED ON 10/16 AT 2054: PREVIOUSLY REPORTED AS SST.RED.[email or web address]    COMMENT 10/16/2020 Add-on orders for these samples will be processed based on acceptable specimen integrity and analyte stability, which may vary by analyte. Final    WBC 10/16/2020 9.1  3.6 - 11.0 K/uL Final    RBC 10/16/2020 4.18  3.80 - 5.20 M/uL Final    HGB 10/16/2020 13.0  11.5 - 16.0 g/dL Final    HCT 10/16/2020 38.7  35.0 - 47.0 % Final    MCV 10/16/2020 92.6  80.0 - 99.0 FL Final    MCH 10/16/2020 31.1  26.0 - 34.0 PG Final    MCHC 10/16/2020 33.6  30.0 - 36.5 g/dL Final    RDW 10/16/2020 11.7  11.5 - 14.5 % Final    PLATELET 45/00/9508 000  150 - 400 K/uL Final    MPV 10/16/2020 9.6  8.9 - 12.9 FL Final    NRBC 10/16/2020 0.0  0  WBC Final    ABSOLUTE NRBC 10/16/2020 0.00  0.00 - 0.01 K/uL Final    NEUTROPHILS 10/16/2020 61  32 - 75 % Final    BAND NEUTROPHILS 10/16/2020 20* 0 - 6 % Final    LYMPHOCYTES 10/16/2020 11* 12 - 49 % Final    MONOCYTES 10/16/2020 6  5 - 13 % Final    EOSINOPHILS 10/16/2020 0  0 - 7 % Final    BASOPHILS 10/16/2020 2* 0 - 1 % Final    IMMATURE GRANULOCYTES 10/16/2020 0  % Final    ABS. NEUTROPHILS 10/16/2020 7.4  1.8 - 8.0 K/UL Final    ABS. LYMPHOCYTES 10/16/2020 1.0  0.8 - 3.5 K/UL Final    ABS. MONOCYTES 10/16/2020 0.5  0.0 - 1.0 K/UL Final    ABS. EOSINOPHILS 10/16/2020 0.0  0.0 - 0.4 K/UL Final    ABS. BASOPHILS 10/16/2020 0.2* 0.0 - 0.1 K/UL Final    ABS. IMM. GRANS.  10/16/2020 0.0  K/UL Final    DF 10/16/2020 MANUAL    Final    RBC COMMENTS 10/16/2020 NORMOCYTIC, NORMOCHROMIC    Final    Sodium 10/16/2020 138  136 - 145 mmol/L Final    Potassium 10/16/2020 3.4* 3.5 - 5.1 mmol/L Final    Chloride 10/16/2020 106  97 - 108 mmol/L Final    CO2 10/16/2020 25  21 - 32 mmol/L Final    Anion gap 10/16/2020 7  5 - 15 mmol/L Final    Glucose 10/16/2020 112* 65 - 100 mg/dL Final    BUN 10/16/2020 15  6 - 20 MG/DL Final    Creatinine 10/16/2020 0.90  0.55 - 1.02 MG/DL Final    BUN/Creatinine ratio 10/16/2020 17  12 - 20   Final    GFR est AA 10/16/2020 >60  >60 ml/min/1.73m2 Final    GFR est non-AA 10/16/2020 >60  >60 ml/min/1.73m2 Final    Estimated GFR is calculated using the IDMS-traceable Modification of Diet in Renal Disease (MDRD) Study equation, reported for both  Americans (GFRAA) and non- Americans (GFRNA), and normalized to 1.73m2 body surface area. The physician must decide which value applies to the patient.  Calcium 10/16/2020 9.4  8.5 - 10.1 MG/DL Final    Bilirubin, total 10/16/2020 0.5  0.2 - 1.0 MG/DL Final    ALT (SGPT) 10/16/2020 20  12 - 78 U/L Final    AST (SGOT) 10/16/2020 16  15 - 37 U/L Final    Alk. phosphatase 10/16/2020 83  45 - 117 U/L Final    Protein, total 10/16/2020 7.8  6.4 - 8.2 g/dL Final    Albumin 10/16/2020 4.2  3.5 - 5.0 g/dL Final    Globulin 10/16/2020 3.6  2.0 - 4.0 g/dL Final    A-G Ratio 10/16/2020 1.2  1.1 - 2.2   Final    Color 10/16/2020 YELLOW/STRAW    Final    Color Reference Range: Straw, Yellow or Dark Yellow    Appearance 10/16/2020 CLEAR  CLEAR   Final    Specific gravity 10/16/2020 1.011  1.003 - 1.030   Final    pH (UA) 10/16/2020 6.5  5.0 - 8.0   Final    Protein 10/16/2020 Negative  NEG mg/dL Final    Glucose 10/16/2020 Negative  NEG mg/dL Final    Ketone 10/16/2020 Negative  NEG mg/dL Final    Bilirubin 10/16/2020 Negative  NEG   Final    Blood 10/16/2020 Negative  NEG   Final    Urobilinogen 10/16/2020 0.2  0.2 - 1.0 EU/dL Final    Nitrites 10/16/2020 Negative  NEG   Final    Leukocyte Esterase 10/16/2020 Negative  NEG   Final    WBC 10/16/2020 0-4  0 - 4 /hpf Final    RBC 10/16/2020 0-5  0 - 5 /hpf Final    Epithelial cells 10/16/2020 FEW  FEW /lpf Final    Epithelial cell category consists of squamous cells and /or transitional urothelial cells.  Renal tubular cells, if present, are separately identified as such.  Bacteria 10/16/2020 Negative  NEG /hpf Final    Hyaline cast 10/16/2020 0-2  0 - 5 /lpf Final    Lactic Acid (POC) 10/16/2020 0.80  0.40 - 2.00 mmol/L Final    Specimen source 10/16/2020 Nasopharyngeal    Final    Specimen source 10/16/2020 Nasopharyngeal    Final    Specimen type 10/16/2020 NP Swab    Final    Health status 10/16/2020 Symptomatic Testing    Final    COVID-19 10/16/2020 Not Detected  Not Detected   Final    Comment: (NOTE)  SARS-CoV-2 (the virus that causes COVID-19) RNA has not been detected  in this specimen. The FDA has authorized this molecular-based testing under its  Emergency Use Authorization program. The [facility] laboratory has  verified the methods performance characteristics as claimed by  manufacturers' instructions for use, as well as the performance  characteristics of the [facility] laboratory-developed test. Fact sheets  for patients and providers are available on the Pathology Laboratory  Services Catalog located at  [email or web address]. Test performed at: [place] [address]           Anita Carrillo MD  [facility]  04/15/22 10:58 AM    Portions of this note may have been populated using smart dictation software and may have \"sounds-like\" errors present. Pt was counseled on risks, benefits and alternatives of treatment options. All questions were asked and answered and the patient was agreeable with the treatment plan as outlined.

## 2022-04-15 NOTE — PATIENT INSTRUCTIONS
Joshua benitez here today to estb care  Recommend we get records from prior pcp--reports recently normal labs  i've asked for them to be sent ot me  I agree neuropsych testing is a good idea, info below  I recommend counseling  I recommend for anxiety trial lexapro  Consider adding wellbutrin on top for attention / focus  May need eval for ADHD as above, neurpsych  Healthy lifestyle habits recommended  F/u 1m or sooner if needed    For Psychology/Psychiatry, can call:  [address]  [address]  [place]  [phone]    Rivas Herndon  [phone]    [address] [place] [address]   [phone]  [phone] [address] Sundar Lonniematias Dillonaydee   PHONE [phone]    [facility]  Crisis Intervention   call anytime  [phone]    [facility]  Address  [address]    Phone: [phone]  Fax: [phone]    Hours  Monday 8 a.m. - 5 p.m. Tuesday - Thursday 8 a.m. - 9 p.m. Friday 8 a.m. - 5 p.m.     FOR TESTING:  Herber Dalalzen  Dr Rosa Muller [facility])  [address] [facility]

## 2022-05-06 ENCOUNTER — OFFICE VISIT (OUTPATIENT)
Dept: OBGYN CLINIC | Age: 27
End: 2022-05-06
Payer: OTHER GOVERNMENT

## 2022-05-06 VITALS
WEIGHT: 129 LBS | DIASTOLIC BLOOD PRESSURE: 64 MMHG | BODY MASS INDEX: 21.49 KG/M2 | SYSTOLIC BLOOD PRESSURE: 108 MMHG | HEIGHT: 65 IN

## 2022-05-06 DIAGNOSIS — N92.6 IRREGULAR MENSES: ICD-10-CM

## 2022-05-06 DIAGNOSIS — Z01.419 WELL WOMAN EXAM: Primary | ICD-10-CM

## 2022-05-06 PROCEDURE — 99395 PREV VISIT EST AGE 18-39: CPT | Performed by: OBSTETRICS & GYNECOLOGY

## 2022-05-06 NOTE — PROGRESS NOTES
Joanne Vásquez is a ,  32 y.o. female WHITE/NON- whose Patient's last menstrual period was 2022. was on 5/3/2022 who presents for her annual checkup. She is having no significant problems. With regard to the Gardisil vaccine, she has received all 3 injections. Menstrual status:    Her periods are light, minimal to nonexistent in flow. She is using three to five pads or tampons per day, usually regular and last 26-30 days. Periods have become more irregular. She has had irregular cycles in the past.  Recently had labs checked by PCP which were normal    She denies dysmenorrhea. She reports no premenstrual symptoms. Contraception:    The current method of family planning is condoms sometimes. She declines contraception and counseling. Sexual history:    She  reports being sexually active and has had partner(s) who are male. She reports using the following methods of birth control/protection: None and Condom. Medical conditions:    Since her last annual GYN exam about one year ago, she has not the following changes in her health history: none. Pap and Mammogram History:    Her most recent Pap smear was normal obtained 18. The patient has never had a mammogram.    The patient does not have a family history of breast cancer. Past Medical History:   Diagnosis Date    Acne     HPV vaccine counseling     Pt completed Gardasil series 10/2013     Past Surgical History:   Procedure Laterality Date    HX HEENT      Cyst removed     HX OTHER SURGICAL      wisdoms teeth, cyst above eye removed as child       Current Outpatient Medications   Medication Sig Dispense Refill    escitalopram oxalate (Lexapro) 10 mg tablet Take 1 Tablet by mouth daily. 30 Tablet 0     Allergies: Patient has no known allergies.    Social History     Socioeconomic History    Marital status:      Spouse name: Kaya Flor Number of children: Not on file    Years of education: Not on file    Highest education level: Not on file   Occupational History    Not on file   Tobacco Use    Smoking status: Never Smoker    Smokeless tobacco: Never Used   Vaping Use    Vaping Use: Never used   Substance and Sexual Activity    Alcohol use: No    Drug use: Never    Sexual activity: Yes     Partners: Male     Birth control/protection: None, Condom   Other Topics Concern     Service Not Asked    Blood Transfusions Not Asked    Caffeine Concern Not Asked    Occupational Exposure Not Asked    Hobby Hazards Not Asked    Sleep Concern Not Asked    Stress Concern Not Asked    Weight Concern Not Asked    Special Diet Not Asked    Back Care Not Asked    Exercise Not Asked    Bike Helmet Not Asked    Seat Belt Not Asked    Self-Exams Not Asked   Social History Narrative    Not on file     Social Determinants of Health     Financial Resource Strain:     Difficulty of Paying Living Expenses: Not on file   Food Insecurity:     Worried About Running Out of Food in the Last Year: Not on file    Suman of Food in the Last Year: Not on file   Transportation Needs:     Lack of Transportation (Medical): Not on file    Lack of Transportation (Non-Medical):  Not on file   Physical Activity:     Days of Exercise per Week: Not on file    Minutes of Exercise per Session: Not on file   Stress:     Feeling of Stress : Not on file   Social Connections:     Frequency of Communication with Friends and Family: Not on file    Frequency of Social Gatherings with Friends and Family: Not on file    Attends [employer] Services: Not on file    Active Member of Clubs or Organizations: Not on file    Attends Club or Organization Meetings: Not on file    Marital Status: Not on file   Intimate Partner Violence:     Fear of Current or Ex-Partner: Not on file    Emotionally Abused: Not on file    Physically Abused: Not on file    Sexually Abused: Not on file   Housing Stability:     Unable to Pay for Housing in the Last Year: Not on file    Number of Places Lived in the Last Year: Not on file    Unstable Housing in the Last Year: Not on file     Tobacco History:  reports that she has never smoked. She has never used smokeless tobacco.  Alcohol Abuse:  reports no history of alcohol use. Drug Abuse:  reports no history of drug use.     Patient Active Problem List   Diagnosis Code   (none) - all problems resolved or deleted       Review of Systems - History obtained from the patient  Constitutional: negative for weight loss, fever, night sweats  HEENT: negative for hearing loss, earache, congestion, snoring, sorethroat  CV: negative for chest pain, palpitations, edema  Resp: negative for cough, shortness of breath, wheezing  GI: negative for change in bowel habits, abdominal pain, black or bloody stools  : negative for frequency, dysuria, hematuria, vaginal discharge  MSK: negative for back pain, joint pain, muscle pain  Breast: negative for breast lumps, nipple discharge, galactorrhea  Skin :negative for itching, rash, hives  Neuro: negative for dizziness, headache, confusion, weakness  Psych: negative for anxiety, depression, change in mood  Heme/lymph: negative for bleeding, bruising, pallor    Physical Exam    Visit Vitals  /64   Ht 5' 5\" (1.651 m)   Wt 129 lb (58.5 kg)   LMP 05/03/2022   BMI 21.47 kg/m²       Constitutional  · Appearance: well-nourished, well developed, alert, in no acute distress    HENT  · Head and Face: appears normal    Neck  · Inspection/Palpation: normal appearance, no masses or tenderness  · Lymph Nodes: no lymphadenopathy present  · Thyroid: gland size normal, nontender, no nodules or masses present on palpation    Chest  · Respiratory Effort: breathing normal  · Auscultation: normal breath sounds    Cardiovascular  · Heart:  · Auscultation: regular rate and rhythm without murmur    Breasts  · Inspection of Breasts: breasts symmetrical, no skin changes, no discharge present, nipple appearance normal, no skin retraction present  · Palpation of Breasts and Axillae: no masses present on palpation, no breast tenderness  · Axillary Lymph Nodes: no lymphadenopathy present    Gastrointestinal  · Abdominal Examination: abdomen non-tender to palpation, normal bowel sounds, no masses present  · Liver and spleen: no hepatomegaly present, spleen not palpable  · Hernias: no hernias identified    Genitourinary  · External Genitalia: normal appearance for age, no discharge present, no tenderness present, no inflammatory lesions present, no masses present, no atrophy present  · Vagina: normal vaginal vault without central or paravaginal defects, no discharge present, no inflammatory lesions present, no masses present  · Bladder: non-tender to palpation  · Urethra: appears normal  · Cervix: normal   · Uterus: normal size, shape and consistency  · Adnexa: no adnexal tenderness present, no adnexal masses present  · Perineum: perineum within normal limits, no evidence of trauma, no rashes or skin lesions present  · Anus: anus within normal limits, no hemorrhoids present  · Inguinal Lymph Nodes: no lymphadenopathy present    Skin  · General Inspection: no rash, no lesions identified    Neurologic/Psychiatric  · Mental Status:  · Orientation: grossly oriented to person, place and time  · Mood and Affect: mood normal, affect appropriate    . Assessment:  Routine gynecologic examination  Her current medical status is satisfactory with no evidence of significant gynecologic issues.   Irregular cycles  Plan:  Counseled re: diet, exercise, healthy lifestyle  Return for yearly wellness visits  Pap  Menstrual calendar  Declines OCP to regulate menses  Advised to take vitamins in case she conceives

## 2022-05-06 NOTE — PATIENT INSTRUCTIONS
Pelvic Exam: Care Instructions  Overview     When your doctor examines your pelvic organs, it's called a pelvic exam. This exam is done to evaluate symptoms, such as pelvic pain or abnormal vaginal bleeding and discharge. It may also be done to collect samples of cells for cervical cancer screening. Before your exam, it's important to share some information with your doctor. You can talk about any concerns you may have. Your doctor will also want to know if you are pregnant or use birth control. And your doctor will want to hear about any problems, surgeries, or procedures you have had in your pelvic area. You will also need to tell your doctor when your last period was. Follow-up care is a key part of your treatment and safety. Be sure to make and go to all appointments, and call your doctor if you are having problems. It's also a good idea to know your test results and keep a list of the medicines you take. How is a pelvic exam done? · During a pelvic exam, you will:  ? Take off your clothes below the waist. You will get a paper or cloth cover to put over the lower half of your body. ? Lie on your back on an exam table with your feet and legs supported by footrests. · The doctor may:  ? Ask you to relax your knees. Your knees need to lean out, toward the walls. ? Check the opening of your vagina for sores or swelling. ? Gently put a tool called a speculum into your vagina. It opens the vagina a little bit. You may feel some pressure. The speculum lets your doctor see inside the vagina. ? Use a small brush, spatula, or swab to get a sample for testing. The doctor then removes the speculum. ? Put on gloves and put one or two fingers of one hand into your vagina. The other hand goes on your lower belly. This lets your doctor feel your pelvic organs. You will probably feel some pressure. ? Put one gloved finger into your rectum and one into your vagina, if needed.  This can also help check your pelvic organs. You may have a small amount of vaginal discharge or bleeding after the exam.  Why is a pelvic exam done? A pelvic exam may be done:  · To collect samples of cells for cervical cancer screening. · To check for vaginal infection. · To check for sexually transmitted infections, such as chlamydia or herpes. · To help find the cause of abnormal uterine bleeding. · To look for problems like uterine fibroids, ovarian cysts, or uterine prolapse. · To help find the cause of pelvic or belly pain. · Before inserting an intrauterine device (IUD). · To collect evidence if you've been sexually assaulted. What are the risks of a pelvic exam?  There is a small chance that the doctor will find something on a pelvic exam that would not have caused a problem. This is called overdiagnosis. It could lead to tests or treatment you don't need. When should you call for help? Watch closely for changes in your health, and be sure to contact your doctor if you have any problems. Where can you learn more? Go to http://www.gray.com/  Enter M421 in the search box to learn more about \"Pelvic Exam: Care Instructions. \"  Current as of: November 22, 2021               Content Version: 13.2  © 9272-7210 [employer]. Care instructions adapted under license by [employer] (which disclaims liability or warranty for this information). If you have questions about a medical condition or this instruction, always ask your healthcare professional. [place] [address] any warranty or liability for your use of this information.

## 2022-05-06 NOTE — PROGRESS NOTES
Gerry Gray is a ,  32 y.o. female WHITE/NON- whose No LMP recorded. was on  who presents for her annual checkup. She is having no significant problems. With regard to the Gardisil vaccine, she has received all 3 injections. Menstrual status:    Her periods are moderate, minimal to nonexistent in flow. She is using one to two pads or tampons per day, usually regular and last 26-30 days. She denies dysmenorrhea. She reports no premenstrual symptoms. Contraception:    The current method of family planning is Ortho-Evra patches weekly and She declines contraception and counseling. Sexual history:    She  reports being sexually active and has had partner(s) who are male. She reports using the following method of birth control/protection: None. Medical conditions:    Since her last annual GYN exam about three or more years ago, she has not the following changes in her health history: none. Pap and Mammogram History:    Her most recent Pap smear was normal obtained 4 year(s) ago. The patient has never had a mammogram.    The patient does not have a family history of breast cancer. Past Medical History:   Diagnosis Date    Acne     HPV vaccine counseling     Pt completed Gardasil series 10/2013     Past Surgical History:   Procedure Laterality Date    HX HEENT      Cyst removed     HX OTHER SURGICAL      wisdoms teeth, cyst above eye removed as child       Current Outpatient Medications   Medication Sig Dispense Refill    escitalopram oxalate (Lexapro) 10 mg tablet Take 1 Tablet by mouth daily. 30 Tablet 0     Allergies: Patient has no known allergies.    Social History     Socioeconomic History    Marital status:      Spouse name: Lexy Gordon Number of children: Not on file    Years of education: Not on file    Highest education level: Not on file   Occupational History    Not on file   Tobacco Use    Smoking status: Never Smoker    Smokeless tobacco: Never Used   Vaping Use    Vaping Use: Never used   Substance and Sexual Activity    Alcohol use: No    Drug use: Never    Sexual activity: Yes     Partners: Male     Birth control/protection: None   Other Topics Concern     Service Not Asked    Blood Transfusions Not Asked    Caffeine Concern Not Asked    Occupational Exposure Not Asked    Hobby Hazards Not Asked    Sleep Concern Not Asked    Stress Concern Not Asked    Weight Concern Not Asked    Special Diet Not Asked    Back Care Not Asked    Exercise Not Asked    Bike Helmet Not Asked   [address] Not Asked    Self-Exams Not Asked   Social History Narrative    Not on file     Social Determinants of Health     Financial Resource Strain:     Difficulty of Paying Living Expenses: Not on file   Food Insecurity:     Worried About Running Out of Food in the Last Year: Not on file    Suman of Food in the Last Year: Not on file   Transportation Needs:     Lack of Transportation (Medical): Not on file    Lack of Transportation (Non-Medical):  Not on file   Physical Activity:     Days of Exercise per Week: Not on file    Minutes of Exercise per Session: Not on file   Stress:     Feeling of Stress : Not on file   Social Connections:     Frequency of Communication with Friends and Family: Not on file    Frequency of Social Gatherings with Friends and Family: Not on file    Attends Religion Services: Not on file    Active Member of [address] [place] [address] [employer] or Organizations: Not on file    Attends Club or Organization Meetings: Not on file    Marital Status: Not on file   Intimate Partner Violence:     Fear of Current or Ex-Partner: Not on file    Emotionally Abused: Not on file    Physically Abused: Not on file    Sexually Abused: Not on file   Housing Stability:     Unable to Pay for Housing in the Last Year: Not on file    Number of [place] in the Last Year: Not on file    Unstable Housing in the Last Year: Not on file     Tobacco History:  reports that she has never smoked. She has never used smokeless tobacco.  Alcohol Abuse:  reports no history of alcohol use. Drug Abuse:  reports no history of drug use. Patient Active Problem List   Diagnosis Code   (none) - all problems resolved or deleted       Review of Systems - History obtained from the patient  Constitutional: negative for weight loss, fever, night sweats  HEENT: negative for hearing loss, earache, congestion, snoring, sorethroat  CV: negative for chest pain, palpitations, edema  Resp: negative for cough, shortness of breath, wheezing  GI: negative for change in bowel habits, abdominal pain, black or bloody stools  : negative for frequency, dysuria, hematuria, vaginal discharge  MSK: negative for back pain, joint pain, muscle pain  Breast: negative for breast lumps, nipple discharge, galactorrhea  Skin :negative for itching, rash, hives  Neuro: negative for dizziness, headache, confusion, weakness  Psych: negative for anxiety, depression, change in mood  Heme/lymph: negative for bleeding, bruising, pallor    Physical Exam    There were no vitals taken for this visit.     Constitutional  · Appearance: well-nourished, well developed, alert, in no acute distress    HENT  · Head and Face: appears normal    Neck  · Inspection/Palpation: normal appearance, no masses or tenderness  · Lymph Nodes: no lymphadenopathy present  · Thyroid: gland size normal, nontender, no nodules or masses present on palpation    Chest  · Respiratory Effort: breathing normal  · Auscultation: normal breath sounds    Cardiovascular  · Heart:  · Auscultation: regular rate and rhythm without murmur    Breasts  · Inspection of Breasts: breasts symmetrical, no skin changes, no discharge present, nipple appearance normal, no skin retraction present  · Palpation of Breasts and Axillae: no masses present on palpation, no breast tenderness  · Axillary Lymph Nodes: no lymphadenopathy present    Gastrointestinal  · Abdominal Examination: abdomen non-tender to palpation, normal bowel sounds, no masses present  · Liver and spleen: no hepatomegaly present, spleen not palpable  · Hernias: no hernias identified    Genitourinary  · External Genitalia: normal appearance for age, no discharge present, no tenderness present, no inflammatory lesions present, no masses present, no atrophy present  · Vagina: normal vaginal vault without central or paravaginal defects, no discharge present, no inflammatory lesions present, no masses present  · Bladder: non-tender to palpation  · Urethra: appears normal  · Cervix: normal   · Uterus: normal size, shape and consistency  · Adnexa: no adnexal tenderness present, no adnexal masses present  · Perineum: perineum within normal limits, no evidence of trauma, no rashes or skin lesions present  · Anus: anus within normal limits, no hemorrhoids present  · Inguinal Lymph Nodes: no lymphadenopathy present    Skin  · General Inspection: no rash, no lesions identified    Neurologic/Psychiatric  · Mental Status:  · Orientation: grossly oriented to person, place and time  · Mood and Affect: mood normal, affect appropriate    . Assessment:  Routine gynecologic examination  Her current medical status is satisfactory with no evidence of significant gynecologic issues.     Plan:  Counseled re: diet, exercise, healthy lifestyle  Return for yearly wellness visits  Gardisil counseling provided  Pt counseled regarding co-testing for high risk HPV with pap  Rec screening mammo

## 2022-05-11 LAB
CYTOLOGIST CVX/VAG CYTO: NORMAL
CYTOLOGY CVX/VAG DOC CYTO: NORMAL
CYTOLOGY CVX/VAG DOC THIN PREP: NORMAL
CYTOLOGY HISTORY:: NORMAL
DX ICD CODE: NORMAL
LABCORP, 190119: NORMAL
Lab: NORMAL
OTHER STN SPEC: NORMAL
STAT OF ADQ CVX/VAG CYTO-IMP: NORMAL

## 2022-05-12 DIAGNOSIS — F41.9 ANXIETY: ICD-10-CM

## 2022-05-12 RX ORDER — ESCITALOPRAM OXALATE 10 MG/1
10 TABLET ORAL DAILY
Qty: 30 TABLET | Refills: 0 | Status: SHIPPED | OUTPATIENT
Start: 2022-05-12 | End: 2022-05-16 | Stop reason: SDUPTHER

## 2022-05-16 ENCOUNTER — VIRTUAL VISIT (OUTPATIENT)
Dept: FAMILY MEDICINE CLINIC | Age: 27
End: 2022-05-16
Payer: OTHER GOVERNMENT

## 2022-05-16 DIAGNOSIS — F41.9 ANXIETY: Primary | ICD-10-CM

## 2022-05-16 DIAGNOSIS — R41.840 ATTENTION AND CONCENTRATION DEFICIT: ICD-10-CM

## 2022-05-16 PROCEDURE — 99214 OFFICE O/P EST MOD 30 MIN: CPT | Performed by: FAMILY MEDICINE

## 2022-05-16 RX ORDER — ESCITALOPRAM OXALATE 20 MG/1
20 TABLET ORAL DAILY
Qty: 30 TABLET | Refills: 1 | Status: SHIPPED | OUTPATIENT
Start: 2022-05-16

## 2022-05-16 NOTE — PROGRESS NOTES
Chief Complaint   Patient presents with    Follow-up     rx is working    Emani Berg Medication Evaluation

## 2022-05-16 NOTE — PATIENT INSTRUCTIONS
FOR COMPREHENSIVE NEUROPSYCH TESTING:    [facility] (Dr Behzad Tse)  Dr Lauren Mcclure [address] [facility])  [address] Dorina Johnson (Dr Danelle Hartman)    [place] [address]

## 2022-05-16 NOTE — PROGRESS NOTES
Rio Francis is a 32 y.o. female who was seen by synchronous (real-time) audio-video technology on 5/16/2022. Consent: Rio Francis, who was seen by synchronous (real-time) audio-video technology, and/or her healthcare decision maker, is aware that this patient-initiated, Telehealth encounter on 5/16/2022 is a billable service, with coverage as determined by her insurance carrier. She is aware that she may receive a bill and has provided verbal consent to proceed: Yes. Assessment & Plan:       ICD-10-CM ICD-9-CM    1. Anxiety  F41.9 300.00 escitalopram oxalate (LEXAPRO) 20 mg tablet   2. Attention and concentration deficit  R41.840 799.51      Increase lexapro and see if improved  Consider wellbutrin if not sufficient  Recommend strongly neuropsych eval discussed last visit  Again gave names  Patient Instructions   FOR COMPREHENSIVE NEUROPSYCH TESTING:    [facility] (Dr Jamil Wilkinson)  Dr Angelina Vegas [address] [place] [address] Neurology)  [facility] (Dr Ginny Nguyễn)    [address]        Pt was counseled on risks, benefits and alternatives of treatment options. All questions were asked and answered and the patient was agreeable with the treatment plan as outlined.       Subjective:   Rio Francis is a 32 y.o. female who was seen for Follow-up (rx is working ) and Medication Evaluation      Fu anxiety  Still irritable  Having a hard time focusing  No negative side effects  Not sure she feels different but she isn't worse  Having vivid dreams  Did have a week or two of insomnia    Sometimes has trouble processing too much noise or touching  Lately having a short fuse when child is repeating things over and over, easily frustrated in that situation    Difficulty at work staying on task at work, completing tasks promptly      Medications, allergies, PMH, PSH, SOCH, HANNAH POPE OF [facility] reviewed and updated per routine protocol, see chart for review and changes if not noted here.    ROS  A 12 point review of systems was negative except as noted here or in the HPI. Objective:   Vital Signs: (As obtained by patient/caregiver at home)  No flowsheet data found. [INSTRUCTIONS:  \"[x]\" Indicates a positive item  \"[]\" Indicates a negative item  -- DELETE ALL ITEMS NOT EXAMINED]    Constitutional: [x] Appears well-developed and well-nourished [x] No apparent distress      [] Abnormal -     Mental status: [x] Alert and awake  [x] Oriented to person/place/time [x] Able to follow commands    [] Abnormal -     Eyes:   EOM    [x]  Normal    [] Abnormal -   Sclera  [x]  Normal    [] Abnormal -          Discharge [x]  None visible   [] Abnormal -     HENT: [x] Normocephalic, atraumatic  [] Abnormal -   [x] Mouth/Throat: Mucous membranes are moist    External Ears [x] Normal  [] Abnormal -    Neck: [x] No visualized mass [] Abnormal -     Pulmonary/Chest: [x] Respiratory effort normal   [x] No visualized signs of difficulty breathing or respiratory distress        [] Abnormal -      Musculoskeletal:   [] Normal gait with no signs of ataxia         [x] Normal range of motion of neck        [] Abnormal -     Neurological:        [x] No Facial Asymmetry (Cranial nerve 7 motor function) (limited exam due to video visit)          [x] No gaze palsy        [] Abnormal -          Skin:        [x] No significant exanthematous lesions or discoloration noted on facial skin         [] Abnormal -            Psychiatric:       [x] Normal Affect [] Abnormal -        [x] No Hallucinations    Other pertinent observable physical exam findings:seated, no distress    We discussed the expected course, resolution and complications of the diagnosis(es) in detail. Medication risks, benefits, costs, interactions, and alternatives were discussed as indicated. I advised her to contact the office if her condition worsens, changes or fails to improve as anticipated.  She expressed understanding with the diagnosis(es) and planRj Nunez is a 32 y.o. female who was evaluated by a video visit encounter for concerns as above. Patient identification was verified prior to start of the visit. A caregiver was present when appropriate. Due to this being a TeleHealth encounter (During BAPWT-19 public health emergency), evaluation of the following organ systems was limited: Vitals/Constitutional/EENT/Resp/CV/GI//MS/Neuro/Skin/Heme-Lymph-Imm. Pursuant to the emergency declaration under the [facility], 1135 waiver authority and the [employer] Act, this Virtual  Visit was conducted, with patient's (and/or legal guardian's) consent, to reduce the patient's risk of exposure to COVID-19 and provide necessary medical care. Services were provided through a video synchronous discussion virtually to substitute for in-person clinic visit. Patient and provider were located at their individual homes. Ladi Walter MD  [facility]  05/16/22 3:58 PM     Portions of this note may have been populated using smart dictation software and may have \"sounds-like\" errors present.

## 2022-05-17 ENCOUNTER — TELEPHONE (OUTPATIENT)
Dept: FAMILY MEDICINE CLINIC | Age: 27
End: 2022-05-17

## 2022-05-17 DIAGNOSIS — R41.840 ATTENTION AND CONCENTRATION DEFICIT: Primary | ICD-10-CM

## 2022-05-17 DIAGNOSIS — F41.9 ANXIETY: ICD-10-CM

## 2022-05-17 NOTE — TELEPHONE ENCOUNTER
Patient needs a referral to see Dr Denny Aiken at [facility].   Her appointment is scheduled for 4/6/23 at 9:40 am

## 2022-11-15 ENCOUNTER — PATIENT MESSAGE (OUTPATIENT)
Dept: OBGYN CLINIC | Age: 27
End: 2022-11-15

## 2022-11-16 NOTE — TELEPHONE ENCOUNTER
Good morning Kiara. I spoke with Dr. Quesada Divers as Dr. Shwetha Sotomayor is out of town. She said  more than likely it is an early miscarriage. You could always come in for labwork but may not be necessary at this time. I can go ahead and cancel the ultrasound. Do you also want me to cancel the appointment with her? If you decide you would like labs drawn please call the office to make the appointment as they will need to make two separate appointments to check blood levels.     Brooklyn Osman

## 2022-11-23 ENCOUNTER — VIRTUAL VISIT (OUTPATIENT)
Dept: FAMILY MEDICINE CLINIC | Age: 27
End: 2022-11-23
Payer: OTHER GOVERNMENT

## 2022-11-23 DIAGNOSIS — G89.29 CHRONIC NECK PAIN: ICD-10-CM

## 2022-11-23 DIAGNOSIS — G44.86 CERVICOGENIC HEADACHE: Primary | ICD-10-CM

## 2022-11-23 DIAGNOSIS — M54.2 CHRONIC NECK PAIN: ICD-10-CM

## 2022-11-23 PROCEDURE — 99213 OFFICE O/P EST LOW 20 MIN: CPT | Performed by: FAMILY MEDICINE
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
## 2022-12-01 ENCOUNTER — HOSPITAL ENCOUNTER (OUTPATIENT)
Dept: PHYSICAL THERAPY | Age: 27
Discharge: HOME | End: 2022-12-01
Payer: OTHER GOVERNMENT

## 2022-12-01 PROCEDURE — 97110 THERAPEUTIC EXERCISES: CPT

## 2022-12-01 PROCEDURE — 97140 MANUAL THERAPY 1/> REGIONS: CPT

## 2022-12-01 PROCEDURE — 97162 PT EVAL MOD COMPLEX 30 MIN: CPT
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
## 2022-12-01 NOTE — THERAPY EVALUATION
Physical Therapy at [facility],   a part of  [facility]  [address] [facility], [address]  Phone: [phone]  Fax: [phone]    Plan of Care/ Statement of Necessity for Physical Therapy Services 2-15    Patient name: Joselyn Halsted  : 1995  Provider#: [id number]  Referral source: Erica Soto MD      Medical/Treatment Diagnosis: Pain in the neck [M54.2]     Prior Hospitalization: see medical history     Comorbidities: NA  Prior Level of Function: Pain less frequently, able to work a full day and care for her daughter   Medications: Verified on Patient Summary List    Start of Care: 22      Onset Date: \"past few months\"       The Plan of Care and following information is based on the information from the initial evaluation. Assessment/ key information: Patient is a pleasant 32year old female presenting with HA and cervical pain, sx consistent with cervicogenic HA's. Current symptoms limit functional ability to drive, sleep through the night, care for her 3year old daughter, or perform job duties and household chores. Marked deficits include upper thoracic hypomobility, cervical AROM restriction, UE weakness and gross upper quarter hypertonicity. Patient will benefit from skilled PT to address all previously listed deficits. Evaluation Complexity History MEDIUM  Complexity : 1-2 comorbidities / personal factors will impact the outcome/ POC ; Examination LOW Complexity : 1-2 Standardized tests and measures addressing body structure, function, activity limitation and / or participation in recreation  ;Presentation MEDIUM Complexity : Evolving with changing characteristics  ; Clinical Decision Making MEDIUM Complexity : FOTO score of 26-74  Overall Complexity Rating: MEDIUM    Problem List: pain affecting function, decrease ROM, decrease strength, decrease ADL/ functional abilitiies, decrease activity tolerance, and decrease flexibility/ joint mobility   Treatment Plan may include any combination of the following: Therapeutic exercise, Neuromuscular reeducation, Manual therapy, Therapeutic activity, Self care/home management, Electric stim unattended , Vasopneumatic device, Ultrasound, Mechanical traction, Electric stim attended, and Needle insertion w/o injection (1 or 2 muscles)  Patient / Family readiness to learn indicated by: asking questions, trying to perform skills, and interest  Persons(s) to be included in education: patient (P)  Barriers to Learning/Limitations: None  Patient Goal (s): Be able to get through the day without feeling pain or with the onset of a headache  Patient Self Reported Health Status: good  Rehabilitation Potential: excellent    Short Term Goals: To be accomplished in 4 weeks:    Patient will be independent with initial HEP in order to transition to general wellness program.  Patient will demonstrate cervical extension AROM to a minimum of 35 degrees to allow for showering and washing her hair. Patient will report 25% reduction in occurrence of HA's to improve tolerance for sleeping through the night. Long Term Goals: To be accomplished in 12 weeks:  Patient will report worst pain no greater than 2/10 to increase QOL and allow for independence with all hygienic self-care and ADL skills   Patient will report 75% reduction in occurrence of HA's to allow for improved sleeping tolerance. Patient will demonstrate 5/5 BUE strength to allow for home cleaning skills independently and without rest breaks needed  Patient will demonstrate pain free cervical AROM WFL to improve ease with household chores like emptying the   Frequency / Duration: Patient to be seen 1-2 times per week for up to 12 weeks.     Patient/ Caregiver education and instruction: self care, activity modification, and exercises    [x]  Plan of care has been reviewed with TANA Macdonald DPT 12/1/2022 ________________________________________________________________________    I certify that the above Therapy Services are being furnished while the patient is under my care. I agree with the treatment plan and certify that this therapy is necessary.     Physician's Signature:____________________  Date:____________Time: _________      Gera Lopez MD

## 2022-12-08 ENCOUNTER — HOSPITAL ENCOUNTER (OUTPATIENT)
Dept: PHYSICAL THERAPY | Age: 27
Discharge: HOME | End: 2022-12-08
Payer: OTHER GOVERNMENT

## 2022-12-08 PROCEDURE — 97110 THERAPEUTIC EXERCISES: CPT

## 2022-12-08 PROCEDURE — 97140 MANUAL THERAPY 1/> REGIONS: CPT

## 2022-12-08 PROCEDURE — 97016 VASOPNEUMATIC DEVICE THERAPY: CPT

## 2022-12-08 NOTE — PROGRESS NOTES
PT DAILY TREATMENT NOTE 2-15    Patient Name: Dani Mazariegos  Date:2022  : 1995  [x]  Patient  Verified  Payor: DWAINE / Plan: Richar Fam 74 / Product Type:  /    In time: 5:07  Out time: 5:00  Total Treatment Time (min): 53  Visit #:  2    Treatment Area: Pain in the neck [M54.2]    SUBJECTIVE  Pain Level (0-10 scale): \"Sore and HA\"  Any medication changes, allergies to medications, adverse drug reactions, diagnosis change, or new procedure performed?: [x] No    [] Yes (see summary sheet for update)  Subjective functional status/changes:   [] No changes reported  Patient reports that she has been feeling sore from her HEP but it is going well. She has tried sleeping on a firmer pillow which reduced her pain in the morning but she feels like it makes her stiffer through the day.       OBJECTIVE    Modality rationale: decrease pain and increase tissue extensibility to improve the patients ability to sit, lift, reach, perform ADL's   Min Type Additional Details       [] Estim: []Att   []Unatt    []TENS instruct                  []IFC  []Premod   []NMES                     []Other:  []w/US   []w/ice   []w/heat  Position:  Location:       []  Traction: [] Cervical       []Lumbar                       [] Prone          []Supine                       []Intermittent   []Continuous Lbs:  [] before manual  [] after manual  []w/heat    []  Ultrasound: []Continuous   [] Pulsed                       at: []1MHz   []3MHz Location:  W/cm2:    [] Paraffin         Location:   []w/heat   15 []  Ice     [x]  Heat  []  Ice massage Position: supine  Location: cervical     []  Laser  []  Other: Position:  Location:     15 [x]  Vasopneumatic Device: cryocap Pressure:       [x] lo [] med [] hi   Temperature: 34     [x] Skin assessment post-treatment:  [x]intact []redness- no adverse reaction    []redness - adverse reaction:         23 min Therapeutic Exercise:  [x] See flow sheet :   Rationale: increase ROM and increase strength to improve the patients ability to sit, lift, reach, perform ADL's    15 min Manual Therapy:  STM B UT/LS, SOR and manual distraction, PROM SM stretching    Rationale: decrease pain, increase ROM, and increase tissue extensibility  to improve the patients ability to sit, lift, reach, perform ADL's            With   [] TE   [] TA   [] Neuro   [] SC   [] other: Patient Education: [x] Review HEP    [] Progressed/Changed HEP based on:   [] positioning   [] body mechanics   [] transfers   [] heat/ice application    [] other:      Other Objective/Functional Measures: reduction in HA with therapeutic interventions     Pain Level (0-10 scale) post treatment: \"better\"    ASSESSMENT/Changes in Function:   Exercises not progressed due to reports of soreness from HEP, reviewed with excellent tolerance and form. Patient will continue to benefit from skilled PT services to modify and progress therapeutic interventions, address functional mobility deficits, address ROM deficits, address strength deficits, analyze and address soft tissue restrictions, analyze and cue movement patterns, analyze and modify body mechanics/ergonomics, assess and modify postural abnormalities, and instruct in home and community integration to attain remaining goals. []  See Plan of Care  []  See progress note/recertification  []  See Discharge Summary         Progress towards goals / Updated goals:   Independent with HEP at initial follow up visit     PLAN  [x]  Upgrade activities as tolerated     [x]  Continue plan of care  [x]  Update interventions per flow sheet       []  Discharge due to:_  []  Other:_      Efraín Levi DPT 12/8/2022

## 2023-01-18 RX ORDER — ONDANSETRON 8 MG/1
8 TABLET, ORALLY DISINTEGRATING ORAL
Qty: 30 TABLET | Refills: 5 | Status: SHIPPED | OUTPATIENT
Start: 2023-01-18

## 2023-01-30 ENCOUNTER — VIRTUAL VISIT (OUTPATIENT)
Dept: FAMILY MEDICINE CLINIC | Age: 28
End: 2023-01-30
Payer: OTHER GOVERNMENT

## 2023-01-30 DIAGNOSIS — F41.9 ANXIETY: ICD-10-CM

## 2023-01-30 DIAGNOSIS — Z34.91 FIRST TRIMESTER PREGNANCY: Primary | ICD-10-CM

## 2023-01-30 PROCEDURE — 99214 OFFICE O/P EST MOD 30 MIN: CPT | Performed by: FAMILY MEDICINE

## 2023-01-30 RX ORDER — SERTRALINE HYDROCHLORIDE 50 MG/1
50 TABLET, FILM COATED ORAL DAILY
Qty: 90 TABLET | Refills: 1 | Status: SHIPPED | OUTPATIENT
Start: 2023-01-30

## 2023-01-30 RX ORDER — PREDNISOLONE ACETATE 10 MG/ML
SUSPENSION/ DROPS OPHTHALMIC
COMMUNITY
Start: 2023-01-17

## 2023-01-30 NOTE — PROGRESS NOTES
Chief Complaint   Patient presents with    Medication Evaluation     Patient is six weeks pregnant and would like to discuss current medications. 1. Have you been to the ER, urgent care clinic since your last visit? Hospitalized since your last visit? No    2. Have you seen or consulted any other health care providers outside of the [address] [place] [address] since your last visit? Include any pap smears or colon screening.  No

## 2023-01-30 NOTE — PROGRESS NOTES
Luther Islas is a 32 y.o. female who was seen by synchronous (real-time) audio-video technology on 1/30/2023. Consent: Luther Islas, who was seen by synchronous (real-time) audio-video technology, and/or her healthcare decision maker, is aware that this patient-initiated, Telehealth encounter on 1/30/2023 is a billable service, with coverage as determined by her insurance carrier. She is aware that she may receive a bill and has provided verbal consent to proceed: Yes. Assessment & Plan:       ICD-10-CM ICD-9-CM    1. First trimester pregnancy  Z34.91 V22.2       2. Anxiety  F41.9 300.00 sertraline (ZOLOFT) 50 mg tablet        Was having nausea on lexapro  Mood off Is less controled, however  Trial zoloft, discussed safety profile in pregnancy, there are many studies regarding this, and less controlled symptoms off medications aren't ideal  Prefer over buspar in pregnancy and has opportunity to help ocd-type behaviors she describes as we up titrate  Keep appt with ob  Keep appt with neuropsych        Pt was counseled on risks, benefits and alternatives of treatment options. All questions were asked and answered and the patient was agreeable with the treatment plan as outlined.     Subjective:   Luther Islas is a 32 y.o. female who was seen for Medication Evaluation (Patient is six weeks pregnant and would like to discuss current medications.)      She self d/c her lexapro  She feels very irritable off of lexapro and more OCD behaviors off of the medication,t hen that triggers feelings of being behind or overwhelmed  She is less tolerant of noise  Feeling like she lashed out more, feeling more annoyed/quick trigger/irritable    Lexapro does cause her to be nauseated    Feeling a lot of pressure/the mental weight regarding her parenting/household responsibilities is significant    Depressed mood feels ok/stable for her    Feels more ocd/attention issues are the predominant factors    Medications, allergies, PMH, PSH, SOCH, [address] reviewed and updated per routine protocol, see chart for review and changes if not noted here. ROS  A 12 point review of systems was negative except as noted here or in the HPI. Objective:   Vital Signs: (As obtained by patient/caregiver at home)  Patient-Reported Vitals 1/30/2023   Patient-Reported Weight 140lb   Patient-Reported Systolic  (No Data)   Patient-Reported LMP 6 weeks pregnant. [INSTRUCTIONS:  \"[x]\" Indicates a positive item  \"[]\" Indicates a negative item  -- DELETE ALL ITEMS NOT EXAMINED]    Constitutional: [x] Appears well-developed and well-nourished [x] No apparent distress      [] Abnormal -     Mental status: [x] Alert and awake  [x] Oriented to person/place/time [x] Able to follow commands    [] Abnormal -     Eyes:   EOM    [x]  Normal    [] Abnormal -   Sclera  [x]  Normal    [] Abnormal -          Discharge [x]  None visible   [] Abnormal -     HENT: [x] Normocephalic, atraumatic  [] Abnormal -   [x] Mouth/Throat: Mucous membranes are moist    External Ears [x] Normal  [] Abnormal -    Neck: [x] No visualized mass [] Abnormal -     Pulmonary/Chest: [x] Respiratory effort normal   [x] No visualized signs of difficulty breathing or respiratory distress        [] Abnormal -      Musculoskeletal:   [] Normal gait with no signs of ataxia         [x] Normal range of motion of neck        [] Abnormal -     Neurological:        [x] No Facial Asymmetry (Cranial nerve 7 motor function) (limited exam due to video visit)          [x] No gaze palsy        [] Abnormal -          Skin:        [x] No significant exanthematous lesions or discoloration noted on facial skin         [] Abnormal -            Psychiatric:       [x] Normal Affect [] Abnormal -        [x] No Hallucinations    Other pertinent observable physical exam findings:seated    We discussed the expected course, resolution and complications of the diagnosis(es) in detail.   Medication risks, benefits, costs, interactions, and alternatives were discussed as indicated. I advised her to contact the office if her condition worsens, changes or fails to improve as anticipated. She expressed understanding with the diagnosis(es) and plan. Melvi Garcia is a 32 y.o. female who was evaluated by a video visit encounter for concerns as above. Patient identification was verified prior to start of the visit. A caregiver was present when appropriate. Due to this being a TeleHealth encounter (During X-79 public health emergency), evaluation of the following organ systems was limited: Vitals/Constitutional/EENT/Resp/CV/GI//MS/Neuro/Skin/Heme-Lymph-Imm. Pursuant to the emergency declaration under the [facility] waiver authority and the [employer] Act, this Virtual  Visit was conducted, with patient's (and/or legal guardian's) consent, to reduce the patient's risk of exposure to COVID-19 and provide necessary medical care. Services were provided through a video synchronous discussion virtually to substitute for in-person clinic visit. Patient and provider were located at their individual homes. Luci Jacobo MD  [facility]  01/30/23 10:49 AM     Portions of this note may have been populated using smart dictation software and may have \"sounds-like\" errors present.

## 2023-02-17 NOTE — PROGRESS NOTES
Conner Aleman is a 32 y.o. female presents for a new pregnancy visit. Chief Complaint   Patient presents with    Initial Prenatal Visit       Problems: none    Patient's last menstrual period was 2022 (exact date). Last Pap: NILM 22    LMP history:  The date of her LMP is  certain. Her last menstrual period was normal and lasted for 4 to 5 days. A urine pregnancy test was positive 4 weeks ago. She was not on the pill at conception. Based on her LMP, her EDC is 23 and her EGA is 9 weeks,6 days. Her menstrual cycles are regular and occur approximately every 28 days  and range from 3 to 5 days. The last menses lasted  the usual number of days. Ultrasound data:  She had an  ultrasound done by the ultrasound tech today which revealed a viable sanchez pregnancy with a gestational age of baby A 6 weeks and 1 days giving an [facility] of 10/16/23. Gestational age of baby B 8 weeks and 6 days giving an [facility] 23. .    Pregnancy symptoms:    Since her LMP she has experienced  urinary frequency, breast tenderness, and nausea. She has not been vomiting over the last few weeks. Associated signs and symptoms which she denies: dysuria, discharge, vaginal bleeding. She states she has gained weight:  Approximately 5 pounds over the last few weeks. Relevant past pregnancy history:   She has the following pregnancy history:     She has no history of  delivery. Relevant past medical history:(relevant to this pregnancy): noncontributory. Her occupation is: Pharmacy Manager. 1. Have you been to the ER, urgent care clinic, or hospitalized since your last visit? No    2. Have you seen or consulted any other health care providers outside of the [address] [place] [address] since your last visit? No    Examination chaperoned by Tawnya Stack RN.

## 2023-02-21 ENCOUNTER — ROUTINE PRENATAL (OUTPATIENT)
Dept: OBGYN CLINIC | Age: 28
End: 2023-02-21

## 2023-02-21 VITALS
DIASTOLIC BLOOD PRESSURE: 68 MMHG | WEIGHT: 133 LBS | BODY MASS INDEX: 22.16 KG/M2 | HEIGHT: 65 IN | SYSTOLIC BLOOD PRESSURE: 119 MMHG

## 2023-02-21 DIAGNOSIS — O09.90 SUPERVISION OF HIGH RISK PREGNANCY, ANTEPARTUM: Primary | ICD-10-CM

## 2023-02-21 NOTE — PROGRESS NOTES
Current pregnancy history:    Lamar Reina is a ,  32 y.o. female [address] [facility] Patient's last menstrual period was 2022 (exact date). .  She presents for the evaluation of amenorrhea and a positive pregnancy test.    Per nursing Note:  LMP history:  The date of her LMP is  certain. Her last menstrual period was normal and lasted for 4 to 5 days. A urine pregnancy test was positive 4 weeks ago. She was not on the pill at conception. Based on her LMP, her EDC is 23 and her EGA is 9 weeks,6 days. Her menstrual cycles are regular and occur approximately every 28 days  and range from 3 to 5 days. The last menses lasted  the usual number of days. Ultrasound data:  She had an  ultrasound done by the ultrasound tech today which revealed a viable sanchez pregnancy with a gestational age of baby A 6 weeks and 1 days giving an [address] of 10/16/23. Gestational age of baby B 8 weeks and 6 days giving an [address] 23. .     Pregnancy symptoms:     Since her LMP she has experienced  urinary frequency, breast tenderness, and nausea. She has not been vomiting over the last few weeks. Associated signs and symptoms which she denies: dysuria, discharge, vaginal bleeding. She states she has gained weight:  Approximately 5 pounds over the last few weeks. Relevant past pregnancy history:              She has the following pregnancy history:                She has no history of  delivery. Relevant past medical history:(relevant to this pregnancy): noncontributory. Her occupation is: Pharmacy Manager. Substance history: negative for alcohol, tobacco and street drugs. Positive for nothing. Exposure history: There is/are no indoor cat/s in the home. The patient was instructed to not change the cat litter. She admits close contact with children on a regular basis.    She has had chicken pox or the vaccine in the past.   Patient denies issues with domestic violence. Genetic Screening/Teratology Counseling: (Includes patient, baby's father, or anyone in either family with:)  3.  Patient's age >/= 28 at [facility]?-- no  .   2. Thalassemia ([facility] [place], [address], or  background): MCV<80?--no.     3.  Neural tube defect (meningomyelocele, spina bifida, anencephaly)?--no.   4.  Congenital heart defect?--no.  5.  Down syndrome?--no.   6.  Aba-Sachs (Gnosticism, [place])?--no.   7.  Canavan's Disease?--no.   8.  Familial Dysautonomia?--no.   9.  Sickle cell disease or trait ()? --no   The patient has not been tested for sickle trait  10. Hemophilia or other blood disorders?--no. 11.  Muscular dystrophy?--no. 12.  Cystic fibrosis?--no. 13.  Mary's Chorea?--no. 14.  Mental retardation/autism (if yes was person tested for Fragile X)?--no. 15.  Other inherited genetic or chromosomal disorder?--no. 12.  Maternal metabolic disorder (DM, PKU, etc)?--no. 17.  Patient or FOB with a child with a birth defect not listed above?--no.  17a. Patient or FOB with a birth defect themselves?--no. 18.  Recurrent pregnancy loss, or stillbirth?--no. 19.  Any medications since LMP other than prenatal vitamins (include vitamins, supplements, OTC meds, drugs, alcohol)?--no. 20.  Any other genetic/environmental exposure to discuss?--no. Infection History:  1. Lives with someone with TB or TB exposed?--no.   2.  Patient or partner has history of genital herpes?--no.  3.  Rash or viral illness since LMP?--no.    4.  History of STD (GC, CT, HPV, syphilis, HIV)? --no   5. Other: OTHER?       Past Medical History:   Diagnosis Date    Acne     HPV vaccine counseling     Pt completed Gardasil series 10/2013     Past Surgical History:   Procedure Laterality Date    HX HEENT      Cyst removed     HX OTHER SURGICAL      wisdoms teeth, cyst above eye removed as child     Social History     Occupational History    Not on file   Tobacco Use    Smoking status: Never Smokeless tobacco: Never   Vaping Use    Vaping Use: Never used   Substance and Sexual Activity    Alcohol use: No    Drug use: Never    Sexual activity: Yes     Partners: Male     Family History   Problem Relation Age of Onset    No Known Problems Mother     No Known Problems Father     Colon Cancer Other      OB History    Para Term  AB Living   2 1 1 0 0 1   SAB IAB Ectopic Molar Multiple Live Births   0 0 0   0 1      # Outcome Date GA Lbr Kishore/2nd Weight Sex Delivery Anes PTL Lv   2 Current            1 Term 20 39w1d  6 lb 14.8 oz (3.14 kg) F CS-LTranv Spinal N AUGUST     No Known Allergies  Prior to Admission medications    Medication Sig Start Date End Date Taking? Authorizing Provider   prednisoLONE acetate (PRED FORTE) 1 % ophthalmic suspension  23   Provider, Historical   PNV Comb #2-Iron-FA-Omega 3 29-1-400 mg cmpk Take 1 Tablet by mouth daily. Provider, Historical   sertraline (ZOLOFT) 50 mg tablet Take 1 Tablet by mouth daily. 23   Radha Cheema MD   ondansetron (ZOFRAN ODT) 8 mg disintegrating tablet Take 1 Tablet by mouth every eight (8) hours as needed for Nausea or Vomiting.  23   Carla Bhat MD        Review of Systems: History obtained from the patient  Constitutional: negative for weight loss, fever, night sweats  HEENT: negative for hearing loss, earache, congestion, snoring, sore throat  CV: negative for chest pain, palpitations, edema  Resp: negative for cough, shortness of breath, wheezing  Breast: negative for breast lumps, nipple discharge, galactorrhea  GI: negative for change in bowel habits, abdominal pain, black or bloody stools  : negative for frequency, dysuria, hematuria, vaginal discharge  MSK: negative for back pain, joint pain, muscle pain  Skin: negative for itching, rash, hives  Neuro: negative for dizziness, headache, confusion, weakness  Psych: negative for anxiety, depression, change in mood  Heme/lymph: negative for bleeding, bruising, pallor    Objective:  Visit Vitals  /68   Ht 5' 5\" (1.651 m)   Wt 133 lb (60.3 kg)   LMP 12/14/2022 (Exact Date)   Breastfeeding No   BMI 22.13 kg/m²       Physical Exam:     Constitutional  Appearance: well-nourished, well developed, alert, in no acute distress    HENT  Head  Face: appears normal  Eyes: appear normal  Ears: normal  Mouth: normal  Lips: no lesions    Neck  Inspection/Palpation: normal appearance, no masses or tenderness  Lymph Nodes: no lymphadenopathy present  Thyroid: gland size normal, nontender, no nodules or masses present on palpation    Chest  Respiratory Effort: breathing unlabored  Auscultation: normal breath sounds    Cardiovascular  Heart:   Auscultation: regular rate and rhythm without murmur    Breasts  Inspection of Breasts: breasts symmetrical, no skin changes, no discharge present, nipple appearance normal, no skin retraction present  Palpation of Breasts and Axillae: no masses present on palpation, no breast tenderness  Axillary Lymph Nodes: no lymphadenopathy present    Gastrointestinal  Abdominal Examination: abdomen non-tender to palpation, normal bowel sounds, no masses present  Liver and spleen: no hepatomegaly present, spleen not palpable  Hernias: no hernias identified    Genitourinary  External Genitalia: normal appearance for age, no discharge present, no tenderness present, no inflammatory lesions present, no masses present, no atrophy present  Vagina: normal vaginal vault without central or paravaginal defects, no discharge present, no inflammatory lesions present, no masses present  Bladder: non-tender to palpation  Urethra: appears normal  Cervix: normal   Uterus: enlarged, normal shape, soft  Adnexa: no adnexal tenderness present, no adnexal masses present  Perineum: perineum within normal limits, no evidence of trauma, no rashes or skin lesions present  Anus: anus within normal limits, no hemorrhoids present  Inguinal Lymph Nodes: no lymphadenopathy present    Skin  General Inspection: no rash, no lesions identified    Neurologic/Psychiatric  Mental Status:  Orientation: grossly oriented to person, place and time  Mood and Affect: mood normal, affect appropriate    Assessment:   Intrauterine pregnancy with the following problems identified:   [facility]  2023 D=US (ACOG evan)  Di/di twins A is S<<D  Prior LTCS breech  Covid vaccine declines  Flu vaccine  [facility] - normal  NIPTS  [facility]      Plan:     Offered CF testing, CVS, Nuchal Translucency, MSAFP, amnio, and discussed NIPT  Course of pregnancy discussed including visit schedule, routine U/S, glucola testing, etc.  Avoid alcoholic beverages and illicit/recreational drugs use  Take prenatal vitamins or folic acid daily. Hospital and practice style discussed with coverage system. Discussed nutrition, toxoplasmosis precautions, sexual activity, exercise, need for influenza vaccine, environmental and work hazards, travel advice, screen for domestic violence, need for seat belts. Discussed seafood, unpasteurized dairy products, deli meat, artificial sweeteners, and caffeine. Information on prenatal classes/breastfeeding given. Information on circumcision given  Patient encouraged not to smoke. Discussed current prescription drug use. Given medication list.  Discussed the use of over the counter medications and chemicals. Route of delivery discussed, including risks, benefits, and alternatives of  versus repeat LTCS. Pt understands risk of hemorrhage during pregnancy and post delivery and would accept blood products if necessary in life-threatening emergencies    Handouts given to pt.

## 2023-02-22 ENCOUNTER — LAB ONLY (OUTPATIENT)
Dept: OBGYN CLINIC | Age: 28
End: 2023-02-22

## 2023-02-22 DIAGNOSIS — O09.90 SUPERVISION OF HIGH RISK PREGNANCY, ANTEPARTUM: Primary | ICD-10-CM

## 2023-02-24 LAB
ABO GROUP BLD: NORMAL
BACTERIA UR CULT: NO GROWTH
BLD GP AB SCN SERPL QL: NEGATIVE
C TRACH RRNA SPEC QL NAA+PROBE: NEGATIVE
ERYTHROCYTE [DISTWIDTH] IN BLOOD BY AUTOMATED COUNT: 11.8 % (ref 11.7–15.4)
HBV SURFACE AG SERPL QL IA: NEGATIVE
HCT VFR BLD AUTO: 36.4 % (ref 34–46.6)
HCV IGG SERPL QL IA: NON REACTIVE
HGB BLD-MCNC: 12.3 G/DL (ref 11.1–15.9)
HIV 1+2 AB+HIV1 P24 AG SERPL QL IA: NON REACTIVE
MCH RBC QN AUTO: 31.1 PG (ref 26.6–33)
MCHC RBC AUTO-ENTMCNC: 33.8 G/DL (ref 31.5–35.7)
MCV RBC AUTO: 92 FL (ref 79–97)
N GONORRHOEA RRNA SPEC QL NAA+PROBE: NEGATIVE
PLATELET # BLD AUTO: 223 X10E3/UL (ref 150–450)
RBC # BLD AUTO: 3.95 X10E6/UL (ref 3.77–5.28)
RH BLD: POSITIVE
RUBV IGG SERPL IA-ACNC: 1.05 INDEX
SPECIMEN STATUS REPORT, ROLRST: NORMAL
T VAGINALIS RRNA SPEC QL NAA+PROBE: NEGATIVE
TREPONEMA PALLIDUM IGG+IGM AB [PRESENCE] IN SERUM OR PLASMA BY IMMUNOASSAY: NON REACTIVE
WBC # BLD AUTO: 5.3 X10E3/UL (ref 3.4–10.8)

## 2023-03-11 ENCOUNTER — HOSPITAL ENCOUNTER (EMERGENCY)
Age: 28
Discharge: HOME | End: 2023-03-11
Attending: EMERGENCY MEDICINE
Payer: OTHER GOVERNMENT

## 2023-03-11 VITALS
WEIGHT: 133 LBS | BODY MASS INDEX: 22.16 KG/M2 | DIASTOLIC BLOOD PRESSURE: 63 MMHG | OXYGEN SATURATION: 98 % | SYSTOLIC BLOOD PRESSURE: 116 MMHG | HEIGHT: 65 IN | HEART RATE: 65 BPM | RESPIRATION RATE: 16 BRPM | TEMPERATURE: 98.1 F

## 2023-03-11 DIAGNOSIS — R07.89 ATYPICAL CHEST PAIN: Primary | ICD-10-CM

## 2023-03-11 DIAGNOSIS — K21.9 GASTROESOPHAGEAL REFLUX DISEASE, UNSPECIFIED WHETHER ESOPHAGITIS PRESENT: ICD-10-CM

## 2023-03-11 LAB
ALBUMIN SERPL-MCNC: 3.3 G/DL (ref 3.5–5)
ALBUMIN/GLOB SERPL: 0.9 (ref 1.1–2.2)
ALP SERPL-CCNC: 52 U/L (ref 45–117)
ALT SERPL-CCNC: 21 U/L (ref 12–78)
ANION GAP SERPL CALC-SCNC: 8 MMOL/L (ref 5–15)
AST SERPL-CCNC: 14 U/L (ref 15–37)
BASOPHILS # BLD: 0 K/UL (ref 0–0.1)
BASOPHILS NFR BLD: 1 % (ref 0–1)
BILIRUB SERPL-MCNC: 0.2 MG/DL (ref 0.2–1)
BUN SERPL-MCNC: 9 MG/DL (ref 6–20)
BUN/CREAT SERPL: 16 (ref 12–20)
CALCIUM SERPL-MCNC: 9.1 MG/DL (ref 8.5–10.1)
CHLORIDE SERPL-SCNC: 104 MMOL/L (ref 97–108)
CO2 SERPL-SCNC: 26 MMOL/L (ref 21–32)
CREAT SERPL-MCNC: 0.55 MG/DL (ref 0.55–1.02)
DIFFERENTIAL METHOD BLD: ABNORMAL
EOSINOPHIL # BLD: 0.2 K/UL (ref 0–0.4)
EOSINOPHIL NFR BLD: 4 % (ref 0–7)
ERYTHROCYTE [DISTWIDTH] IN BLOOD BY AUTOMATED COUNT: 11.9 % (ref 11.5–14.5)
GLOBULIN SER CALC-MCNC: 3.5 G/DL (ref 2–4)
GLUCOSE SERPL-MCNC: 94 MG/DL (ref 65–100)
HCT VFR BLD AUTO: 33.6 % (ref 35–47)
HGB BLD-MCNC: 11.6 G/DL (ref 11.5–16)
IMM GRANULOCYTES # BLD AUTO: 0 K/UL (ref 0–0.04)
IMM GRANULOCYTES NFR BLD AUTO: 0 % (ref 0–0.5)
LIPASE SERPL-CCNC: 180 U/L (ref 73–393)
LYMPHOCYTES # BLD: 2.5 K/UL (ref 0.8–3.5)
LYMPHOCYTES NFR BLD: 41 % (ref 12–49)
MCH RBC QN AUTO: 31 PG (ref 26–34)
MCHC RBC AUTO-ENTMCNC: 34.5 G/DL (ref 30–36.5)
MCV RBC AUTO: 89.8 FL (ref 80–99)
MONOCYTES # BLD: 0.5 K/UL (ref 0–1)
MONOCYTES NFR BLD: 8 % (ref 5–13)
NEUTS SEG # BLD: 2.9 K/UL (ref 1.8–8)
NEUTS SEG NFR BLD: 47 % (ref 32–75)
NRBC # BLD: 0 K/UL (ref 0–0.01)
NRBC BLD-RTO: 0 PER 100 WBC
PLATELET # BLD AUTO: 209 K/UL (ref 150–400)
PMV BLD AUTO: 9.4 FL (ref 8.9–12.9)
POTASSIUM SERPL-SCNC: 3.5 MMOL/L (ref 3.5–5.1)
PROT SERPL-MCNC: 6.8 G/DL (ref 6.4–8.2)
RBC # BLD AUTO: 3.74 M/UL (ref 3.8–5.2)
SODIUM SERPL-SCNC: 138 MMOL/L (ref 136–145)
TROPONIN I SERPL HS-MCNC: 6 NG/L (ref 0–51)
WBC # BLD AUTO: 6.2 K/UL (ref 3.6–11)

## 2023-03-11 PROCEDURE — 80053 COMPREHEN METABOLIC PANEL: CPT

## 2023-03-11 PROCEDURE — 83690 ASSAY OF LIPASE: CPT

## 2023-03-11 PROCEDURE — 99284 EMERGENCY DEPT VISIT MOD MDM: CPT

## 2023-03-11 PROCEDURE — 36415 COLL VENOUS BLD VENIPUNCTURE: CPT

## 2023-03-11 PROCEDURE — 85025 COMPLETE CBC W/AUTO DIFF WBC: CPT

## 2023-03-11 PROCEDURE — 84484 ASSAY OF TROPONIN QUANT: CPT

## 2023-03-11 PROCEDURE — 93005 ELECTROCARDIOGRAM TRACING: CPT

## 2023-03-11 RX ORDER — OMEPRAZOLE 40 MG/1
40 CAPSULE, DELAYED RELEASE ORAL DAILY
Qty: 302 CAPSULE | Refills: 0 | Status: SHIPPED | OUTPATIENT
Start: 2023-03-11

## 2023-03-12 LAB
ATRIAL RATE: 56 BPM
CALCULATED P AXIS, ECG09: 71 DEGREES
CALCULATED R AXIS, ECG10: 80 DEGREES
CALCULATED T AXIS, ECG11: 49 DEGREES
DIAGNOSIS, 93000: NORMAL
P-R INTERVAL, ECG05: 158 MS
Q-T INTERVAL, ECG07: 434 MS
QRS DURATION, ECG06: 86 MS
QTC CALCULATION (BEZET), ECG08: 418 MS
VENTRICULAR RATE, ECG03: 56 BPM

## 2023-03-12 NOTE — ED NOTES
Pt up and ambulatory at d/c. D/c instructions reviewed with voiced understanding of instructions, followup and medication.

## 2023-03-12 NOTE — ED PROVIDER NOTES
71-year-old female with a past medical history significant for acne, , approximately 11 weeks pregnant who presents to the ER with a complaint of intermittent episodes of midsternal chest pain that radiated to her head and neck that began approximately 3 hours prior to arrival to the ER, dull, cramping and pressure has subsided at this time after taking Tums this evening. The patient denies any fever and chills, cough or congestion, headache, neck and back pain, chest pain, shortness of breath, vomiting, diarrhea, constipation, dysuria, dizziness, extremity weakness or numbness, sick contact, fevers or recent travel, vaginal bleeding, prior history of same.        Past Medical History:   Diagnosis Date    Acne     HPV vaccine counseling     Pt completed Gardasil series 10/2013       Past Surgical History:   Procedure Laterality Date    HX HEENT      Cyst removed     HX OTHER SURGICAL      wisdoms teeth, cyst above eye removed as child         Family History:   Problem Relation Age of Onset    No Known Problems Mother     No Known Problems Father     Colon Cancer Other        Social History     Socioeconomic History    Marital status:      Spouse name: Abdiel    Number of children: Not on file    Years of education: Not on file    Highest education level: Not on file   Occupational History    Not on file   Tobacco Use    Smoking status: Never    Smokeless tobacco: Never   Vaping Use    Vaping Use: Never used   Substance and Sexual Activity    Alcohol use: No    Drug use: Never    Sexual activity: Yes     Partners: Male   Other Topics Concern     Service Not Asked    Blood Transfusions Not Asked    Caffeine Concern Not Asked    Occupational Exposure Not Asked    Hobby Hazards Not Asked    Sleep Concern Not Asked    Stress Concern Not Asked    Weight Concern Not Asked    Special Diet Not Asked    Back Care Not Asked    Exercise Not Asked    Bike Helmet Not Asked    Seat Belt Not Asked    Self-Exams Not Asked   Social History Narrative    Not on file     Social Determinants of Health     Financial Resource Strain: Not on file   Food Insecurity: Not on file   Transportation Needs: Not on file   Physical Activity: Not on file   Stress: Not on file   Social Connections: Not on file   Intimate Partner Violence: Not on file   Housing Stability: Not on file         ALLERGIES: Patient has no known allergies. Review of Systems   All other systems reviewed and are negative. Vitals:    03/11/23 1924   BP: 114/69   Pulse: (!) 57   Resp: 16   Temp: 98.6 °F (37 °C)   SpO2: 99%   Weight: 60.3 kg (133 lb)   Height: 5' 5\" (1.651 m)            Physical Exam  Vitals and nursing note reviewed. CONSTITUTIONAL: Well-appearing; well-nourished; in no apparent distress  HEAD: Normocephalic; atraumatic  EYES: PERRL; EOM intact; conjunctiva and sclera are clear bilaterally. ENT: No rhinorrhea; normal pharynx with no tonsillar hypertrophy; mucous membranes pink/moist, no erythema, no exudate. NECK: Supple; non-tender; no cervical lymphadenopathy  CARD: Normal S1, S2; no murmurs, rubs, or gallops. Regular rate and rhythm. RESP: Normal respiratory effort; breath sounds clear and equal bilaterally; no wheezes, rhonchi, or rales. ABD: Normal bowel sounds; non-distended; non-tender; no palpable organomegaly, no masses, no bruits. Back Exam: Normal inspection; no vertebral point tenderness, no CVA tenderness. Normal range of motion. EXT: Normal ROM in all four extremities; non-tender to palpation; no swelling or deformity; distal pulses are normal, no edema. SKIN: Warm; dry; no rash. NEURO:Alert and oriented x 3, coherent, PORFIRIO-XII grossly intact, sensory and motor are non-focal.        Medical Decision Making  Assessment: 31-year-old female who presents to the ER for evaluation of chest pain that has subsided at this time. She is hemodynamically stable. She has a fairly benign exam and looks well.   Differential diagnosis includes ACS, GERD, pancreatitis rule out electrolytes abnormality. Plan: EKG/lab/IV fluid/education, reassurance, symptomatic treatment/serial exam/ Monitor and Reevaluate. Amount and/or Complexity of Data Reviewed  Labs: ordered. ECG/medicine tests: ordered. Risk  Prescription drug management. Procedures    ED EKG interpretation:  Rhythm: sinus bradycardia; and regular . Rate (approx.): 56; Axis: normal; P wave: normal; QRS interval: normal ; ST/T wave: normal; in  Lead: Diffusely; Other findings: borderline ekg. This EKG was interpreted by Bebeto Hart MD,ED Provider. Progress Note:   Pt has been reexamined by Bebeto Hart MD. Pt is feeling much better. Symptoms have improved. Patient had an extensive work-up without any significant findings. She remained well. Symptomatic relief after taking Tums. She has been entirely symptom-free while in the emergency department. Suspect GERD as the patient has had intermittent episodes of nausea and vomiting throughout her pregnancy and current taking Zofran on a daily basis. all available results have been reviewed with pt and any available family. Pt understands sx, dx, and tx in ED. Care plan has been outlined and questions have been answered. Pt is ready to go home. Will send home on chest pain and GERD instruction. Prescription of Prilosec. . Outpatient referral with PCP as needed. Written by Bebeto Hart MD,8:39 PM    .   .

## 2023-03-12 NOTE — ED TRIAGE NOTES
Pt reports she took a nap this afternoon and after waking up she experienced some pain and discomfort in midsternal area. Pt took tums as she has had heartburn before. Pt reports it improved initially but then got worse behind left breast. Pt reports being 11 weeks pregnant with regular prenatal care.

## 2023-03-13 ENCOUNTER — PATIENT MESSAGE (OUTPATIENT)
Dept: OBGYN CLINIC | Age: 28
End: 2023-03-13

## 2023-03-15 NOTE — PROGRESS NOTES
[facility]  9/20/2023 D=US (ACOG evan)  Di/di twins A is S<<D  Prior LTCS breech  Covid vaccine declines  Flu vaccine  Horizon - normal  NIPTS identical twin girls   [facility]

## 2023-03-17 ENCOUNTER — ROUTINE PRENATAL (OUTPATIENT)
Dept: OBGYN CLINIC | Age: 28
End: 2023-03-17

## 2023-03-17 VITALS — BODY MASS INDEX: 22.63 KG/M2 | WEIGHT: 136 LBS | DIASTOLIC BLOOD PRESSURE: 62 MMHG | SYSTOLIC BLOOD PRESSURE: 103 MMHG

## 2023-03-17 DIAGNOSIS — O09.90 SUPERVISION OF HIGH RISK PREGNANCY, ANTEPARTUM: ICD-10-CM

## 2023-03-17 DIAGNOSIS — Z34.80 SUPERVISION OF OTHER NORMAL PREGNANCY, ANTEPARTUM: Primary | ICD-10-CM

## 2023-03-17 NOTE — PROGRESS NOTES
Saw AYSE at [facility] - now with sanchez  NIPTS normal female identical twins  Doing well  AFP in 3 weeks

## 2023-04-06 ENCOUNTER — OFFICE VISIT (OUTPATIENT)
Dept: NEUROLOGY | Age: 28
End: 2023-04-06
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
## 2023-04-07 ENCOUNTER — ROUTINE PRENATAL (OUTPATIENT)
Dept: OBGYN CLINIC | Age: 28
End: 2023-04-07
Payer: OTHER GOVERNMENT

## 2023-04-07 PROCEDURE — 0502F SUBSEQUENT PRENATAL CARE: CPT | Performed by: OBSTETRICS & GYNECOLOGY

## 2023-04-14 ENCOUNTER — OFFICE VISIT (OUTPATIENT)
Dept: NEUROLOGY | Age: 28
End: 2023-04-14
Payer: OTHER GOVERNMENT

## 2023-04-14 DIAGNOSIS — F43.23 ADJUSTMENT DISORDER WITH MIXED ANXIETY AND DEPRESSED MOOD: ICD-10-CM

## 2023-04-14 DIAGNOSIS — F52.0 HYPOACTIVE SEXUAL DESIRE DISORDER: ICD-10-CM

## 2023-04-14 DIAGNOSIS — G31.84 MILD COGNITIVE IMPAIRMENT: Primary | ICD-10-CM

## 2023-04-14 DIAGNOSIS — F90.0 ATTENTION DEFICIT HYPERACTIVITY DISORDER (ADHD), INATTENTIVE TYPE, MODERATE: Chronic | ICD-10-CM

## 2023-04-14 DIAGNOSIS — Z63.0 MARITAL STRESS: ICD-10-CM

## 2023-04-14 DIAGNOSIS — Z87.820 HISTORY OF CONCUSSION: ICD-10-CM

## 2023-04-14 PROCEDURE — 96133 NRPSYC TST EVAL PHYS/QHP EA: CPT | Performed by: CLINICAL NEUROPSYCHOLOGIST

## 2023-04-14 PROCEDURE — 96137 PSYCL/NRPSYC TST PHY/QHP EA: CPT | Performed by: CLINICAL NEUROPSYCHOLOGIST

## 2023-04-14 PROCEDURE — 96139 PSYCL/NRPSYC TST TECH EA: CPT | Performed by: CLINICAL NEUROPSYCHOLOGIST

## 2023-04-14 PROCEDURE — 96132 NRPSYC TST EVAL PHYS/QHP 1ST: CPT | Performed by: CLINICAL NEUROPSYCHOLOGIST

## 2023-04-14 PROCEDURE — 96136 PSYCL/NRPSYC TST PHY/QHP 1ST: CPT | Performed by: CLINICAL NEUROPSYCHOLOGIST

## 2023-04-14 PROCEDURE — 96138 PSYCL/NRPSYC TECH 1ST: CPT | Performed by: CLINICAL NEUROPSYCHOLOGIST

## 2023-04-14 SDOH — SOCIAL STABILITY - SOCIAL INSECURITY: PROBLEMS IN RELATIONSHIP WITH SPOUSE OR PARTNER: Z63.0
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
## 2023-05-03 RX ORDER — ASCORBIC ACID, CHOLECALCIFEROL, .ALPHA.-TOCOPHEROL ACETATE, DL-, PYRIDOXINE, FOLIC ACID, CYANOCOBALAMIN, CALCIUM, FERROUS FUMARATE, MAGNESIUM, DOCONEXENT 85; 200; 10; 25; 1; 12; 140; 27; 45; 300 [IU]/1; [IU]/1; [IU]/1; [IU]/1; MG/1; UG/1; MG/1; MG/1; MG/1; MG/1
1 CAPSULE, GELATIN COATED ORAL DAILY
COMMUNITY
Start: 2023-02-21

## 2023-05-03 RX ORDER — PREDNISOLONE ACETATE 10 MG/ML
SUSPENSION/ DROPS OPHTHALMIC
COMMUNITY
Start: 2023-01-17

## 2023-05-03 RX ORDER — OMEPRAZOLE 40 MG/1
40 CAPSULE, DELAYED RELEASE ORAL DAILY
COMMUNITY
Start: 2023-03-11

## 2023-05-12 ENCOUNTER — ROUTINE PRENATAL (OUTPATIENT)
Age: 28
End: 2023-05-12

## 2023-05-12 VITALS — SYSTOLIC BLOOD PRESSURE: 129 MMHG | WEIGHT: 138 LBS | DIASTOLIC BLOOD PRESSURE: 73 MMHG | BODY MASS INDEX: 22.96 KG/M2

## 2023-05-12 DIAGNOSIS — Z3A.20 20 WEEKS GESTATION OF PREGNANCY: ICD-10-CM

## 2023-05-12 DIAGNOSIS — Z34.80 ENCOUNTER FOR SUPERVISION OF OTHER NORMAL PREGNANCY, UNSPECIFIED TRIMESTER: Primary | ICD-10-CM

## 2023-05-12 DIAGNOSIS — O28.3 ABNORMAL FETAL ULTRASOUND: ICD-10-CM

## 2023-05-12 DIAGNOSIS — N89.8 VAGINAL DISCHARGE: ICD-10-CM

## 2023-05-12 NOTE — PROGRESS NOTES
Baby moving  Has vaginal itching - see note    US today absent CSP, separate sac with ?placenta tissue ?vanishing twin? ??   Disc with patient and  - will refer back to [facility]  Fu in 4 weeks

## 2023-05-12 NOTE — PROGRESS NOTES
OB/GYN Problem VIsit    HPI  Eliana Miranda is a ,  32 y.o. female who presents for a problem visit. Currently 20 weeks pregnant. Has increased vaginal discharge and irritation. Pain with IC. No bleeding. No recent abx. No hx of HSV    History reviewed. No pertinent past medical history. History reviewed. No pertinent surgical history. Social History     Occupational History    Not on file   Tobacco Use    Smoking status: Never    Smokeless tobacco: Never   Substance and Sexual Activity    Alcohol use: Not Currently    Drug use: Never    Sexual activity: Not on file     History reviewed. No pertinent family history. No Known Allergies  Prior to Admission medications    Medication Sig Start Date End Date Taking?  Authorizing Provider   Porsche w/o B-YO-Usfemrj-FA-DHA (PNV-DHA) 27-0.6-0.4-300 MG CAPS Take 1 tablet by mouth daily 23  Yes Historical Provider, MD   omeprazole (PRILOSEC) 40 MG delayed release capsule Take 1 capsule by mouth daily 3/11/23  Yes Historical Provider, MD   sertraline (ZOLOFT) 50 MG tablet Take 1 tablet by mouth daily 23  Yes Historical Provider, MD   prednisoLONE acetate (PRED FORTE) 1 % ophthalmic suspension  23  Yes Historical Provider, MD        Review of Systems: History obtained from the patient  Constitutional: negative for weight loss, fever, night sweats  Breast: negative for breast lumps, nipple discharge, galactorrhea  GI: negative for change in bowel habits, abdominal pain, black or bloody stools  : negative for frequency, dysuria, hematuria, vaginal discharge  MSK: negative for back pain, joint pain, muscle pain  Skin: negative for itching, rash, hives  Psych: negative for anxiety, depression, change in mood      Objective:  /73   Wt 138 lb (62.6 kg)   LMP 2022   BMI 22.96 kg/m²     Physical Exam:   PHYSICAL EXAMINATION    Constitutional  Appearance: well-nourished, well developed, alert, in no acute

## 2023-05-12 NOTE — PROGRESS NOTES
Patient Active Problem List    Diagnosis Date Noted    Supervision of high risk pregnancy, antepartum 02/21/2023     [facility]  9/20/2023 D=US (ACOG sary)  Di/di twins A is S<<D - demise A first trimester  Prior LTCS breech  Covid vaccine declines  Flu vaccine  Horizon - normal  NIPTS identical twin girls (now [place])  MFM [facility]  AFP screen negative

## 2023-05-16 ENCOUNTER — HOSPITAL ENCOUNTER (OUTPATIENT)
Facility: HOSPITAL | Age: 28
Discharge: HOME | End: 2023-05-16
Attending: EMERGENCY MEDICINE | Admitting: OBSTETRICS & GYNECOLOGY
Payer: OTHER GOVERNMENT

## 2023-05-16 VITALS
HEART RATE: 70 BPM | TEMPERATURE: 98.1 F | DIASTOLIC BLOOD PRESSURE: 69 MMHG | RESPIRATION RATE: 18 BRPM | OXYGEN SATURATION: 98 % | SYSTOLIC BLOOD PRESSURE: 118 MMHG

## 2023-05-16 DIAGNOSIS — O26.892 ABDOMINAL PAIN DURING PREGNANCY IN SECOND TRIMESTER: Primary | ICD-10-CM

## 2023-05-16 DIAGNOSIS — R10.9 ABDOMINAL PAIN DURING PREGNANCY IN SECOND TRIMESTER: Primary | ICD-10-CM

## 2023-05-16 LAB
A VAGINAE DNA VAG QL NAA+PROBE: ABNORMAL SCORE
BVAB2 DNA VAG QL NAA+PROBE: ABNORMAL SCORE
C ALBICANS DNA VAG QL NAA+PROBE: POSITIVE
C GLABRATA DNA VAG QL NAA+PROBE: NEGATIVE
MEGA1 DNA VAG QL NAA+PROBE: ABNORMAL SCORE

## 2023-05-16 PROCEDURE — 4500000002 HC ER NO CHARGE

## 2023-05-16 PROCEDURE — 99282 EMERGENCY DEPT VISIT SF MDM: CPT

## 2023-05-16 NOTE — ED TRIAGE NOTES
Dr. Srinivasa Marie to triage for evaluation, pt to proceed to L& D for further evaluation.  L& D  Marvin, notified, ED tech to transport pt to L&D

## 2023-05-16 NOTE — PROGRESS NOTES
1700: Baron Jin is a 32 y.o. GP@ at 20w6d patient of  [facility] who presents to L&D triage with c/o cramping. She reports Positive FM, denies vaginal bleeding and LOF. She also denies Headaches, Scotoma, RUQ pain, and Edema. Urine sample obtained. EFM and toco placed for initial assessment. 1715: Dr. Ryan Wallace at bedside for eval. POC discussed. Orders given for d/c.    1728: Pt ambulated off unit. Pt remains pregnant. All instructions reviewed and all questions answered. Health Maintenance Due   Topic Date Due   â¢ Pneumococcal Vaccine 0-64 (2 - PCV) 04/04/2019   â¢ Medicare Advantage- Medicare Wellness Visit  Never done   â¢ COVID-19 Vaccine (4 - Booster for Sharlett Prier series) 06/16/2022   â¢ Diabetes Foot Exam  06/29/2022       Patient is due for topics listed above, she wishes to proceed with Immunization(s) Pneumococcal, Diabetes Foot Exam and [address] (Medicare Wellness Visit), but is not proceeding with Immunization(s) COVID-19 at this time.  The following has occurred: Orders placed for Immunization(s) Pneumococcal.

## 2023-05-16 NOTE — H&P
OB History & Physical    Name: Ros Casas MRN: [id number]  SSN: [id number]    YOB: 1995  Age: 32 y.o. Sex: female      Subjective:     Reason for Admission:  Pregnancy and low abdominal and back pain    History of Present Illness: Ros Casas is a 32 y.o.  female with an estimated gestational age of 23w11d with Estimated Date of Delivery: 23. Patient complains of bilateral LQ pain that occasionally radiates around to the back. She reports also occasional cramping. She denies bleeding, but reports increased yellowish vaginal discharge and intermittent itching. She reports fetal activity. The patient states she was seen at her provider's office yesterday and was told urine was clear, and a vaginal swab was done. Previous OB:  Term CS for breech    OB History          2    Para   1    Term   1            AB        Living   1         SAB        IAB        Ectopic        Molar        Multiple        Live Births   1              Past Medical History:   Diagnosis Date    Mental disorder      Past Surgical History:   Procedure Laterality Date    ABDOMEN SURGERY       Social History     Occupational History    Not on file   Tobacco Use    Smoking status: Never    Smokeless tobacco: Never   Substance and Sexual Activity    Alcohol use: Not Currently    Drug use: Never    Sexual activity: Not on file     No family history on file. SH:  Patient denies tobacco, alcohol, recreational drugs. . FH: reviewed and negative    Review of systems:    General: Denies fever, sweats, chills  CV: Denies CP, palpitations  Resp: Denies SOB, cough  GI: Denies nausea, vomiting, diarrhea  : Denies dysura, abnormal frequency, hematuria  Neuro: Denies HA, visual disturbance  All other systems reviewed and are negative    No Known Allergies  Prior to Admission medications    Medication Sig Start Date End Date Taking?  Authorizing Provider   Porsche w/o Y-XF-Igdvycy-FA-DHA (PNV-DHA)

## 2023-05-17 NOTE — ED PROVIDER NOTES
Brief Medical Screening Examination for OB patient at triage    HPI: 25yo female  at 24 weeks pregnant complains of bilateral lower quadrant abdominal pain with cramping that radiates around to the back, started over the last couple hours. ROS: Abdominal cramping    Vitals: No data found. Physical Exam:  General appearance: No apparent distress. Stable vitals. Afebrile. Skin exam: Warm and dry. No pallor. Neurologic: Alert and oriented. Abdominal exam: Soft, nontender. Gravid uterus. Differential diagnosis: Labor, premature labor, threatened miscarriage, [place] contractions, uterine contractions in pregnancy, premature rupture of membranes. This patient with a primary obstetrical chief complaint is stable and medically cleared for direct transfer to the [facility] Labor & Delivery unit for further monitoring and workup. Medical screening exam is complete.     MD Jefferson King MD  23 [phone]

## 2023-05-18 ENCOUNTER — HOSPITAL ENCOUNTER (OUTPATIENT)
Facility: HOSPITAL | Age: 28
Discharge: HOME | End: 2023-05-19
Attending: OBSTETRICS & GYNECOLOGY | Admitting: OBSTETRICS & GYNECOLOGY
Payer: OTHER GOVERNMENT

## 2023-05-18 PROCEDURE — 4500000002 HC ER NO CHARGE

## 2023-05-19 VITALS
SYSTOLIC BLOOD PRESSURE: 105 MMHG | RESPIRATION RATE: 16 BRPM | OXYGEN SATURATION: 98 % | DIASTOLIC BLOOD PRESSURE: 52 MMHG | HEART RATE: 70 BPM | TEMPERATURE: 98 F

## 2023-05-19 PROBLEM — O09.90 SUPERVISION OF HIGH RISK PREGNANCY, ANTEPARTUM: Status: ACTIVE | Noted: 2023-02-21

## 2023-05-19 LAB
BASOPHILS # BLD: 0 K/UL (ref 0–0.1)
BASOPHILS NFR BLD: 0 % (ref 0–1)
DIFFERENTIAL METHOD BLD: ABNORMAL
EOSINOPHIL # BLD: 0.1 K/UL (ref 0–0.4)
EOSINOPHIL NFR BLD: 2 % (ref 0–7)
ERYTHROCYTE [DISTWIDTH] IN BLOOD BY AUTOMATED COUNT: 12.4 % (ref 11.5–14.5)
HCT VFR BLD AUTO: 30.7 % (ref 35–47)
HGB BLD-MCNC: 10.3 G/DL (ref 11.5–16)
IMM GRANULOCYTES # BLD AUTO: 0 K/UL (ref 0–0.04)
IMM GRANULOCYTES NFR BLD AUTO: 1 % (ref 0–0.5)
LYMPHOCYTES # BLD: 1.5 K/UL (ref 0.8–3.5)
LYMPHOCYTES NFR BLD: 24 % (ref 12–49)
MCH RBC QN AUTO: 31.5 PG (ref 26–34)
MCHC RBC AUTO-ENTMCNC: 33.6 G/DL (ref 30–36.5)
MCV RBC AUTO: 93.9 FL (ref 80–99)
MONOCYTES # BLD: 0.4 K/UL (ref 0–1)
MONOCYTES NFR BLD: 6 % (ref 5–13)
NEUTS SEG # BLD: 4.3 K/UL (ref 1.8–8)
NEUTS SEG NFR BLD: 67 % (ref 32–75)
NRBC # BLD: 0 K/UL (ref 0–0.01)
NRBC BLD-RTO: 0 PER 100 WBC
PLATELET # BLD AUTO: 214 K/UL (ref 150–400)
PMV BLD AUTO: 9.5 FL (ref 8.9–12.9)
RBC # BLD AUTO: 3.27 M/UL (ref 3.8–5.2)
WBC # BLD AUTO: 6.3 K/UL (ref 3.6–11)

## 2023-05-19 PROCEDURE — 36415 COLL VENOUS BLD VENIPUNCTURE: CPT

## 2023-05-19 PROCEDURE — 85025 COMPLETE CBC W/AUTO DIFF WBC: CPT

## 2023-05-19 NOTE — DISCHARGE INSTRUCTIONS
Counting Your Baby's Kicks: Care Instructions  Overview     Counting your baby's kicks is one way your doctor can tell that your baby is healthy. Most women--especially in a first pregnancy--feel their baby move for the first time between 16 and 22 weeks. The movement may feel like flutters rather than kicks. Your baby may move more at certain times of the day. When you are active, you may notice less kicking than when you are resting. At your prenatal visits, your doctor will ask whether the baby is active. In your last trimester, your doctor may ask you to count the number of times you feel your baby move. Follow-up care is a key part of your treatment and safety. Be sure to make and go to all appointments, and call your doctor if you are having problems. It's also a good idea to know your test results and keep a list of the medicines you take. How do you count fetal kicks? A common method of checking your baby's movement is to note the length of time it takes to count ten movements (such as kicks, flutters, or rolls). Pick your baby's most active time of day to count. This may be any time from morning to evening. If you don't feel 10 movements in an hour, have something to eat or drink and count for another hour. If you don't feel at least 10 movements in the 2-hour period, call your doctor. When should you call for help? Call your doctor now or seek immediate medical care if:    You noticed that your baby has stopped moving or is moving much less than normal.   Watch closely for changes in your health, and be sure to contact your doctor if you have any problems. Where can you learn more? Go to http://www.woods.com/ and enter U048 to learn more about \"Counting Your Baby's Kicks: Care Instructions. \"  Current as of: November 9, 2022               Content Version: 13.6  © 1319-2442 [employer]wise, Incorporated. Care instructions adapted under license by [employer] [facility] ([facility]).  If you have

## 2023-05-19 NOTE — PROGRESS NOTES
0720: Bedside and Verbal shift change report given to 791 Adry Lemus (oncoming nurse) by Rambo Grady RN (offgoing nurse). Report included the following information Nurse Handoff Report, MAR, and Recent Results. 0935: Dr. Janessa Conrad at bedside. POC discussed, ultrasound performed FHR obtained.

## 2023-05-19 NOTE — PROGRESS NOTES
MERISSA Rodarte presents with a rash on the R lower leg for the past 3days .  has not had this type of problem before. Symptomatically, this rash does not bother him.    New exposure to cloths soaps, colognes, perfumes, laundry detergents, other chemical agents [+] band-aid     [-] SOB or difficulties swallowing    New food exposure No      Past Medical History:   Diagnosis Date   • CAD in native artery 06/30/2022   • Cellulitis and abscess of foot 09/12/2014   • Coronary artery disease    • Diabetes mellitus (CMS/HCC)    • Diabetes mellitus type II, controlled (CMS/HCC) 11/29/2000   • Essential (primary) hypertension    • Essential hypertension 02/12/2020   • Hyperlipemia 11/29/2000   • Hypokalemia 06/30/2022   • Osteomyelitis (CMS/HCC) 10/02/2014   • Pseudophakia 11/14/2019   • Type 2 diabetes mellitus with both eyes affected by mild nonproliferative retinopathy without macular edema, with long-term current use of insulin (CMS/HCC) 01/03/2020      Past Surgical History:   Procedure Laterality Date   • Cardiac catherization     • Colonoscopy diagnostic  2017   • Colonoscopy diagnostic  10/10/2022    Diverticulosis in the sigmoid colon and in the descending colon   • Esophagogastroduodenoscopy transoral flex w/bx single or mult  10/10/2022   • Tavr directaortic marciano - cv  07/19/2022      Social History     Tobacco Use   • Smoking status: Never Smoker   • Smokeless tobacco: Never Used   Vaping Use   • Vaping Use: never used   Substance Use Topics   • Alcohol use: No   • Drug use: Never      Current Outpatient Medications   Medication Sig Dispense Refill   • torsemide (DEMADEX) 10 MG tablet Take 1 tablet by mouth as needed (weight gain, edema, dyspnea). 30 tablet    • spironolactone (ALDACTONE) 25 MG tablet Take 0.5 tablets by mouth every other day. 30 tablet 1   • aflibercept (EYLEA) 2 MG/0.05ML Solution injection 2 mg by Intravitreal route every 5 weeks.     • sacubitril-valsartan (Entresto) 24-26 MG per tablet  Pt without complaints. No VB, no pain, no fever    BP (!) 105/52   Pulse 70   Temp 98 °F (36.7 °C) (Oral)   Resp 16   LMP 12/14/2022   SpO2 98%       Abdomen: NT      Disc with [facility] - they will only see her outpatient next week    Disc with Dr. Ender Sanchez at [facility] - she recommends patient be discharged if stable and show up on 6th floor main hospital at [facility]. If patient would rather then can arrange transfer. Options offered to patient, she will drive to [facility]. Alternative, patient is actually stable to discharge home - previable. Disc in detail with family. Take 1 tablet by mouth in the morning and 1 tablet in the evening. 60 tablet 3   • metoPROLOL succinate (TOPROL-XL) 25 MG 24 hr tablet Take 0.5 tablets by mouth daily. 45 tablet 3   • clopidogrel (PLAVIX) 75 MG tablet Take 1 tablet by mouth daily. 90 tablet 3   • glipiZIDE (GLUCOTROL) 10 MG tablet Take 1 tablet by mouth in the morning and 1 tablet in the evening. 180 tablet 1   • empagliflozin (JARDIANCE) 10 MG tablet Take 1 tablet by mouth daily (before breakfast). Indications: Cardiac Failure 30 tablet 3   • atorvastatin (LIPITOR) 40 MG tablet Take 1 tablet by mouth nightly. 90 tablet 3   • aspirin (ECOTRIN) 81 MG EC tablet Take 81 mg by mouth daily.     • Alcohol Swabs (B-D SINGLE USE SWABS REGULAR) Pads USE AS DIRECTED 300 each 3   • Blood Glucose Calibration (ACCU-CHEK ZEE) Solution Use with Accucheck Zee glucometer.     • Blood Glucose Monitoring Suppl (ACCU-CHEK ZEE PLUS) w/Device Kit Use to check blood sugar 4 times daily. E11.9     • Glucose Blood (BLOOD GLUCOSE TEST STRIPS) Strip CHECK BLOOD SUGAR FOUR TIMES DAILY       No current facility-administered medications for this visit.          OBJECTIVE: NAD, Blood pressure 118/62, pulse 74, temperature 98.7 °F (37.1 °C), resp. rate 16, SpO2 100 %. Eyes revealed no injection or d/c, DOMENICA      Oral exam unremarkable      Neck revealed no adenopathy or thyromegally, HRRR, LCTA.      Neck ROM normal      Abdomen soft, BS [+], no masses or tenderness to palpation      Peripheral pulse was intact, capillary refill was normal, sensory function was intact and no edema       Skin exam revealed :  macular papular erythematous eruption, localized right leg    ASSESSMENT: Dermatitis    PLAN: Discussed clinical situation with the patient . Symptomatic measures discussed   and meds discussed  Cayden will follow up with his primary care physician as needed or as directed but may return to the [facility] if needed. If symptoms become severe, pt instructed to go to the  ER                                 [id number]

## 2023-05-19 NOTE — PROGRESS NOTES
Patient presents complaining of leaking fluid since 2200. I offered to  evaluate her, and she refused, insisting on her MD coming. Dr. Anahy Murray has agreed to come in and evaluate her.

## 2023-05-19 NOTE — PROGRESS NOTES
Ante Partum Progress Note    [facility]  27T3L        Patient states she does have continuous leaking. Not feeling baby move    Vitals:  BP (!) 105/52   Pulse 70   Temp 98 °F (36.7 °C) (Oral)   Resp 16   LMP 2022   SpO2 98%   Temp (24hrs), Av.4 °F (36.9 °C), Min:98 °F (36.7 °C), Max:98.8 °F (37.1 °C)      Last 24hr Input/Output:  No intake or output data in the 24 hours ending 23 1057     Non stress test:  visible on US    Uterine Activity: None     Exam:  Patient without distress. Abdomen, fundus soft non-tender     Extremities, no redness or tenderness                   Labs:     pending        No results found for this or any previous visit (from the past 24 hour(s)).     Assessment: 21w2d   PPROM    Plan:  awaiting NICU consult

## 2023-05-19 NOTE — H&P
History & Physical    Name: William Morelos MRN: [id number]  SSN: [id number]    YOB: 1995  Age: 32 y.o. Sex: female        Subjective:     Estimated Date of Delivery: 23  OB History          2    Para   1    Term   1            AB        Living   1         SAB        IAB        Ectopic        Molar        Multiple        Live Births   1                Ms. Sydney Currie is admitted with pregnancy at 21w2d for  PPROM . Prenatal course was complicated by  vanishing twin/uterine synechiae . PPROM tonight. No pain, no bleeding. Had some cramping several days ago, resolved. She is followed by MFM at [facility] - see [address] in [facility]. Please see prenatal records for details. Past Medical History:   Diagnosis Date    Mental disorder      Past Surgical History:   Procedure Laterality Date    ABDOMEN SURGERY       Social History     Occupational History    Not on file   Tobacco Use    Smoking status: Never    Smokeless tobacco: Never   Substance and Sexual Activity    Alcohol use: Not Currently    Drug use: Never    Sexual activity: Not on file     No family history on file. No Known Allergies  Prior to Admission medications    Medication Sig Start Date End Date Taking? Authorizing Provider   terconazole (TERAZOL 3) 0.8 % vaginal cream Place vaginally nightly x 3 nights 23   Cam Muhammad MD   Prenat w/o H-NL-Lucuafa-FA-DHA (PNV-DHA) 27-0.6-0.4-300 MG CAPS Take 1 tablet by mouth daily 23   Historical Provider, MD   omeprazole (PRILOSEC) 40 MG delayed release capsule Take 1 capsule by mouth daily  Patient not taking: Reported on 2023 3/11/23   Historical Provider, MD   sertraline (ZOLOFT) 50 MG tablet Take 1 tablet by mouth daily  Patient not taking: Reported on 2023   Historical Provider, MD   prednisoLONE acetate (PRED FORTE) 1 % ophthalmic suspension  23   Historical Provider, MD        Review of Systems: Pertinent items are noted in HPI.     Objective:

## 2023-05-19 NOTE — PROGRESS NOTES
2310 pt arrived on unit with complaints of SROM around 10pm of large amount of clear fluid. Noted patients pants wet and noted fluid leaking on floor. Pt denies feeling contractions. Oriented patient to room and monitor. 2315 pts  arrived in room, appears very upset, asking why a physician wasn't present in room, nurse explained that we were trying to obtain some information.  requested a physician now to come to see his wife. 12 Dr. Arcadio Mukherjee notified of patients request to see a physician Now.     Leeann Mukherjee present in room, assessing patient and obtaining medical history.  become very upset and asked why the physician was asking questions that was already answered.  and patient requested that Dr. Osito Hutchins come in and see her. Dr. Arcadio Mukherjee, stated he would notify physician on call which would be Dr. Darryn Goldman. Patient and  voiced approval.     Katie Hutchins present in patients room, sterile spec. Done, with verification of SROM clear fluid. Dr. Osito Hutchins spoke to the patient regarding options for delivery or possible transfer to another facility. Patient and  states whey will discuss tonight and talk to Neonatologist in the morning. 0100 orders received that patient can come off monitor for the night, and if patient states having contractions or any discomfort we can assess for contractions.

## 2023-05-23 ENCOUNTER — TELEPHONE (OUTPATIENT)
Age: 28
End: 2023-05-23

## 2023-05-23 NOTE — TELEPHONE ENCOUNTER
Two patient identifiers used      32year old  21w6d pregnant      Rosa Maria calling from [place] [address] , at [facility] wondering if they still need to see the above patient for PROM    Patient was seen or still being followed at [facility] 2023    Contact number is [phone]    Thank you

## 2023-05-23 NOTE — TELEPHONE ENCOUNTER
This nurse attempted to reach the MFM and left a  detailed message that patient does not need follow up

## 2023-10-18 ENCOUNTER — TELEMEDICINE (OUTPATIENT)
Facility: CLINIC | Age: 28
End: 2023-10-18
Payer: OTHER GOVERNMENT

## 2023-10-18 DIAGNOSIS — R73.09 ELEVATED RANDOM BLOOD GLUCOSE LEVEL: Primary | ICD-10-CM

## 2023-10-18 DIAGNOSIS — F90.0 ATTENTION DEFICIT HYPERACTIVITY DISORDER (ADHD), PREDOMINANTLY INATTENTIVE TYPE: ICD-10-CM

## 2023-10-18 DIAGNOSIS — R68.82 LOW LIBIDO: ICD-10-CM

## 2023-10-18 DIAGNOSIS — R53.83 OTHER FATIGUE: ICD-10-CM

## 2023-10-18 DIAGNOSIS — F43.23 ADJUSTMENT DISORDER WITH MIXED ANXIETY AND DEPRESSED MOOD: ICD-10-CM

## 2023-10-18 PROBLEM — O09.90 SUPERVISION OF HIGH RISK PREGNANCY, ANTEPARTUM: Status: RESOLVED | Noted: 2023-02-21 | Resolved: 2023-10-18

## 2023-10-18 PROCEDURE — 99214 OFFICE O/P EST MOD 30 MIN: CPT | Performed by: FAMILY MEDICINE

## 2023-10-18 RX ORDER — DESOGESTREL AND ETHINYL ESTRADIOL 0.15-0.03
1 KIT ORAL DAILY
COMMUNITY
Start: 2023-08-17

## 2023-10-18 SDOH — ECONOMIC STABILITY: FOOD INSECURITY: WITHIN THE PAST 12 MONTHS, YOU WORRIED THAT YOUR FOOD WOULD RUN OUT BEFORE YOU GOT MONEY TO BUY MORE.: NEVER TRUE

## 2023-10-18 SDOH — ECONOMIC STABILITY: HOUSING INSECURITY
IN THE LAST 12 MONTHS, WAS THERE A TIME WHEN YOU DID NOT HAVE A STEADY PLACE TO SLEEP OR SLEPT IN A SHELTER (INCLUDING NOW)?: NO

## 2023-10-18 SDOH — ECONOMIC STABILITY: INCOME INSECURITY: HOW HARD IS IT FOR YOU TO PAY FOR THE VERY BASICS LIKE FOOD, HOUSING, MEDICAL CARE, AND HEATING?: NOT HARD AT ALL

## 2023-10-18 SDOH — ECONOMIC STABILITY: FOOD INSECURITY: WITHIN THE PAST 12 MONTHS, THE FOOD YOU BOUGHT JUST DIDN'T LAST AND YOU DIDN'T HAVE MONEY TO GET MORE.: NEVER TRUE

## 2023-10-18 ASSESSMENT — PATIENT HEALTH QUESTIONNAIRE - PHQ9
SUM OF ALL RESPONSES TO PHQ QUESTIONS 1-9: 0
2. FEELING DOWN, DEPRESSED OR HOPELESS: 0
1. LITTLE INTEREST OR PLEASURE IN DOING THINGS: 0
SUM OF ALL RESPONSES TO PHQ QUESTIONS 1-9: 0
SUM OF ALL RESPONSES TO PHQ QUESTIONS 1-9: 0
SUM OF ALL RESPONSES TO PHQ9 QUESTIONS 1 & 2: 0
SUM OF ALL RESPONSES TO PHQ QUESTIONS 1-9: 0

## 2023-10-18 NOTE — PROGRESS NOTES
Chief Complaint   Patient presents with    Follow-up   . 1. Have you been to the ER, urgent care clinic since your last visit? Hospitalized since your last visit? No    2. Have you seen or consulted any other health care providers outside of the [address] [place] [address] since your last visit? Include any pap smears or colon screening.  No

## 2023-10-18 NOTE — PROGRESS NOTES
Candice Hunter is a 29 y.o. female who was seen by synchronous (real-time) audio-video technology on 10/18/2023. Consent: Candice Hunter, who was seen by synchronous (real-time) audio-video technology, and/or her healthcare decision maker, is aware that this patient-initiated, Telehealth encounter on 10/18/2023 is a billable service, with coverage as determined by her insurance carrier. She is aware that she may receive a bill and has provided verbal consent to proceed: Yes. Assessment & Plan:      Diagnosis Orders   1. Elevated random blood glucose level  CBC with Auto Differential    Comprehensive Metabolic Panel    Hemoglobin A1C      2. Other fatigue  Thyroid Cascade Profile    CBC with Auto Differential    Ferritin    Comprehensive Metabolic Panel      3. Attention deficit hyperactivity disorder (ADHD), predominantly inattentive type        4. Adjustment disorder with mixed anxiety and depressed mood        5. Low libido          Startw ith labs per orders  Do establish with oBGYN, plans to be seen at [facility] due to pregnancy risk  Can check in with Dr Minna Sexton over there  Recommenc start with counseling services--may benefit from both individual and also couples / intimacy counseling  Would not start with meds, per patient preference  Fu after results        Pt was counseled on risks, benefits and alternatives of treatment options. All questions were asked and answered and the patient was agreeable with the treatment plan as outlined.     Subjective:   Candice Hunter is a 29 y.o. female who was seen for Follow-up      Last visit we talked about medications  She says she had seen Dr Charlie Florentino  She did not take zoloft  At the time she was pregnant    At 21 weeks she lost her pregnancy, unfortunately, she wanted to review the Neuropsych testing     After her miscarriage and procedure she has low iron and was having significant fatigue  Lab Results   Component Value Date    WBC 6.3 05/19/2023    HGB 10.3

## 2023-11-12 ENCOUNTER — HOSPITAL ENCOUNTER (EMERGENCY)
Facility: HOSPITAL | Age: 28
Discharge: HOME | End: 2023-11-12
Attending: EMERGENCY MEDICINE
Payer: OTHER GOVERNMENT

## 2023-11-12 VITALS
OXYGEN SATURATION: 99 % | WEIGHT: 137 LBS | RESPIRATION RATE: 18 BRPM | HEART RATE: 59 BPM | BODY MASS INDEX: 22.82 KG/M2 | TEMPERATURE: 98 F | SYSTOLIC BLOOD PRESSURE: 119 MMHG | HEIGHT: 65 IN | DIASTOLIC BLOOD PRESSURE: 75 MMHG

## 2023-11-12 DIAGNOSIS — R05.1 ACUTE COUGH: Primary | ICD-10-CM

## 2023-11-12 DIAGNOSIS — J01.10 ACUTE NON-RECURRENT FRONTAL SINUSITIS: ICD-10-CM

## 2023-11-12 PROCEDURE — 99283 EMERGENCY DEPT VISIT LOW MDM: CPT

## 2023-11-12 RX ORDER — FLUCONAZOLE 150 MG/1
150 TABLET ORAL ONCE
Qty: 1 TABLET | Refills: 0 | Status: SHIPPED | OUTPATIENT
Start: 2023-11-12 | End: 2023-11-12

## 2023-11-12 RX ORDER — DOXYCYCLINE HYCLATE 100 MG
100 TABLET ORAL 2 TIMES DAILY
Qty: 20 TABLET | Refills: 0 | Status: SHIPPED | OUTPATIENT
Start: 2023-11-12 | End: 2023-11-22

## 2023-11-12 ASSESSMENT — PAIN DESCRIPTION - DESCRIPTORS: DESCRIPTORS: PRESSURE

## 2023-11-12 ASSESSMENT — PAIN DESCRIPTION - ONSET: ONSET: ON-GOING

## 2023-11-12 ASSESSMENT — PAIN SCALES - GENERAL: PAINLEVEL_OUTOF10: 2

## 2023-11-12 ASSESSMENT — PAIN DESCRIPTION - ORIENTATION: ORIENTATION: MID

## 2023-11-12 NOTE — ED NOTES
Pt discharged to home at this time. All discharge instructions provided to patient. All questions answered. No distress noted at time of discharge.       Vinny Mckeon RN  11/12/23 [id number]

## 2023-11-12 NOTE — ED TRIAGE NOTES
Pt presents with sinus pressure, cough, and sore throat that has been going on for a week. Pt feels like symptoms are getting worse. Denies fever. Taking cough drops only right now. States she felt weak this morning while in the shower.

## 2023-11-12 NOTE — ED PROVIDER NOTES
[facility] EMERGENCY DEPT  EMERGENCY DEPARTMENT ENCOUNTER      Pt Name: Dora Nixon  MRN: [id number]  [address] [facility] 1995  Date of evaluation: 11/12/2023  Provider: Jair Christensen       Chief Complaint   Patient presents with    Cough    Sinusitis         HISTORY OF PRESENT ILLNESS   (Location/Symptom, Timing/Onset, Context/Setting, Quality, Duration, Modifying Factors, Severity)  Note limiting factors. HPI      Review of External Medical Records:     Nursing Notes were reviewed. REVIEW OF SYSTEMS    (2-9 systems for level 4, 10 or more for level 5)     Review of Systems    Except as noted above the remainder of the review of systems was reviewed and negative. PAST MEDICAL HISTORY     Past Medical History:   Diagnosis Date    Mental disorder     Supervision of high risk pregnancy, antepartum 2/21/2023    [facility]  9/20/2023 D=US (ACOG sary) Di/di twins A is S<<D - demise A first trimester Prior LTCS breech Covid vaccine declines Flu vaccine [place] - normal NIPTS identical twin girls (now [place]) MFM [facility] AFP screen negative          SURGICAL HISTORY       Past Surgical History:   Procedure Laterality Date    ABDOMEN SURGERY           CURRENT MEDICATIONS       Previous Medications    DESOGESTREL-ETHINYL ESTRADIOL (APRI) 0.15-30 MG-MCG PER TABLET    Take 1 tablet by mouth daily    PRENAT W/O O-FN-HUEYFYQ-FA-DHA (PNV-DHA) 27-0.6-0.4-300 MG CAPS    Take 1 tablet by mouth daily       ALLERGIES     Patient has no known allergies. FAMILY HISTORY     History reviewed. No pertinent family history.        SOCIAL HISTORY       Social History     Socioeconomic History    Marital status:      Spouse name: None    Number of children: None    Years of education: None    Highest education level: None   Tobacco Use    Smoking status: Never    Smokeless tobacco: Never   Vaping Use    Vaping Use: Never used   Substance and Sexual Activity    Alcohol use: Not Currently    Drug use: Never

## 2024-02-27 ENCOUNTER — OFFICE VISIT (OUTPATIENT)
Facility: CLINIC | Age: 29
End: 2024-02-27
Payer: OTHER GOVERNMENT

## 2024-02-27 VITALS
WEIGHT: 140 LBS | OXYGEN SATURATION: 98 % | HEIGHT: 65 IN | SYSTOLIC BLOOD PRESSURE: 107 MMHG | HEART RATE: 71 BPM | BODY MASS INDEX: 23.32 KG/M2 | TEMPERATURE: 97.6 F | DIASTOLIC BLOOD PRESSURE: 66 MMHG

## 2024-02-27 DIAGNOSIS — F43.23 ADJUSTMENT DISORDER WITH MIXED ANXIETY AND DEPRESSED MOOD: Primary | ICD-10-CM

## 2024-02-27 PROCEDURE — 99214 OFFICE O/P EST MOD 30 MIN: CPT | Performed by: FAMILY MEDICINE

## 2024-02-27 RX ORDER — SERTRALINE HYDROCHLORIDE 100 MG/1
100 TABLET, FILM COATED ORAL DAILY
Qty: 90 TABLET | Refills: 1 | Status: SHIPPED | OUTPATIENT
Start: 2024-02-27

## 2024-02-27 ASSESSMENT — PATIENT HEALTH QUESTIONNAIRE - PHQ9
2. FEELING DOWN, DEPRESSED OR HOPELESS: 1
SUM OF ALL RESPONSES TO PHQ QUESTIONS 1-9: 2
SUM OF ALL RESPONSES TO PHQ QUESTIONS 1-9: 2
SUM OF ALL RESPONSES TO PHQ9 QUESTIONS 1 & 2: 2
SUM OF ALL RESPONSES TO PHQ QUESTIONS 1-9: 2
SUM OF ALL RESPONSES TO PHQ QUESTIONS 1-9: 2
1. LITTLE INTEREST OR PLEASURE IN DOING THINGS: 1

## 2024-02-27 NOTE — PROGRESS NOTES
Family Medicine Follow-Up Progress Note  Patient: Lucrecia Villareal  1995, 28 y.o., female  Encounter Date: 2/27/2024     ASSESSMENT & PLAN    ICD-10-CM    1. Adjustment disorder with mixed anxiety and depressed mood  F43.23           No orders of the defined types were placed in this encounter.      Patient Instructions   Zoloft initiation  Discussed r/b/a and safety in pregnancy    Ok to initiate 1/2 tab, 50mg daily  At 2 wk ok to increase to 100mg daily and continue on that dose          CHIEF COMPLAINT  Chief Complaint   Patient presents with    Discuss Medications    Anxiety    Depression       SUBJECTIVE  Lucrecia Villareal is a 28 y.o. female presenting today for follow up    Does feel depressed, does feel anxious  Has seen Dr Sierra  Did see therapy--doing CBT (with james watt at [facility])  Is considering medication mgmt at this point    Not pregnant right now    Is anticipating pregnancy    +irritability/anxiety    Neuropsych note reviewed again today    + adhd, not desiring to start medications that would be    Started back to the gym 4-5 days / week, that is a good balance for her, deliberate on keeping balance   She says she is looking forward to it  Going with her sister as an accountability rubi      ROS  Review of Systems  A 12 point review of systems was negative except as noted here or in the HPI.    OBJECTIVE  /66 (Site: Left Upper Arm, Position: Sitting, Cuff Size: Medium Adult)   Pulse 71   Temp 97.6 °F (36.4 °C) (Temporal)   Ht 1.651 m (5' 5\")   Wt 63.5 kg (140 lb)   LMP 02/16/2024 (Exact Date)   SpO2 98%   BMI 23.30 kg/m²     Physical Exam  Vitals and nursing note reviewed.   Constitutional:       General: She is not in acute distress.     Appearance: Normal appearance. She is normal weight. She is not toxic-appearing.      Comments: Ambulating to room   HENT:      Head: Normocephalic and atraumatic.      Right Ear: External ear normal.      Left Ear: External ear normal.

## 2024-02-27 NOTE — PROGRESS NOTES
Identified pt with two pt identifiers(name and ). Reviewed record in preparation for visit and have obtained necessary documentation. All patient medications has been reviewed.  Chief Complaint   Patient presents with    Discuss Medications    Anxiety    Depression       Vitals:    24 0952   BP: 107/66   Pulse: 71   Temp: 97.6 °F (36.4 °C)   SpO2: 98%                   Coordination of Care Questionnaire:   1) Have you been to an emergency room, urgent care, or hospitalized since your last visit?   no    2. Have seen or consulted any other health care provider since your last visit? no

## 2024-02-27 NOTE — PATIENT INSTRUCTIONS
Zoloft initiation  Discussed r/b/a and safety in pregnancy    Ok to initiate 1/2 tab, 50mg daily  At 2 wk ok to increase to 100mg daily and continue on that dose

## 2024-06-08 ENCOUNTER — HOSPITAL ENCOUNTER (EMERGENCY)
Facility: HOSPITAL | Age: 29
Discharge: HOME | End: 2024-06-08
Attending: EMERGENCY MEDICINE
Payer: OTHER GOVERNMENT

## 2024-06-08 VITALS
DIASTOLIC BLOOD PRESSURE: 55 MMHG | BODY MASS INDEX: 22.16 KG/M2 | SYSTOLIC BLOOD PRESSURE: 108 MMHG | TEMPERATURE: 98.5 F | HEART RATE: 72 BPM | HEIGHT: 65 IN | RESPIRATION RATE: 16 BRPM | OXYGEN SATURATION: 98 % | WEIGHT: 133 LBS

## 2024-06-08 DIAGNOSIS — J01.90 ACUTE SINUSITIS, RECURRENCE NOT SPECIFIED, UNSPECIFIED LOCATION: Primary | ICD-10-CM

## 2024-06-08 PROCEDURE — 99283 EMERGENCY DEPT VISIT LOW MDM: CPT

## 2024-06-08 RX ORDER — AMOXICILLIN 875 MG/1
875 TABLET, COATED ORAL 2 TIMES DAILY
Qty: 20 TABLET | Refills: 0 | Status: SHIPPED | OUTPATIENT
Start: 2024-06-08 | End: 2024-06-18

## 2024-06-08 ASSESSMENT — PAIN DESCRIPTION - DESCRIPTORS: DESCRIPTORS: PRESSURE;BURNING

## 2024-06-08 ASSESSMENT — ENCOUNTER SYMPTOMS
SINUS CONGESTION: 1
DIARRHEA: 0
NAUSEA: 0
BACK PAIN: 0
VOMITING: 0
CONSTIPATION: 0
RHINORRHEA: 1
COUGH: 1
ABDOMINAL PAIN: 0
COLOR CHANGE: 0
SHORTNESS OF BREATH: 0

## 2024-06-08 ASSESSMENT — PAIN DESCRIPTION - ORIENTATION: ORIENTATION: ANTERIOR

## 2024-06-08 ASSESSMENT — PAIN - FUNCTIONAL ASSESSMENT
PAIN_FUNCTIONAL_ASSESSMENT: ACTIVITIES ARE NOT PREVENTED
PAIN_FUNCTIONAL_ASSESSMENT: 0-10

## 2024-06-08 ASSESSMENT — PAIN DESCRIPTION - PAIN TYPE: TYPE: ACUTE PAIN

## 2024-06-08 ASSESSMENT — PAIN SCALES - GENERAL: PAINLEVEL_OUTOF10: 7

## 2024-06-08 ASSESSMENT — PAIN DESCRIPTION - LOCATION: LOCATION: HEAD;FACE

## 2024-06-09 NOTE — ED TRIAGE NOTES
Patient arrives with cc of productive cough and nasal congestion for several days. Patient is 16wks pregnant, and \"concerned that if I wait too long my body won't be able to fight off an infection without harm to my pregnancy\". This pregnancy is normal so far patient states, however patient went into labor at 21 weeks with her previous.     Patient denies fevers, chills, lightheadness, and dizziness.

## 2024-06-09 NOTE — ED PROVIDER NOTES
[facility] EMERGENCY DEPT  EMERGENCY DEPARTMENT ENCOUNTER      Pt Name: Lucrecia Villareal  MRN: [id number]  Birthdate 1995  Date of evaluation: 6/8/2024  Provider: Mando Gambino MD    CHIEF COMPLAINT       Chief Complaint   Patient presents with    Cough         HISTORY OF PRESENT ILLNESS   (Location/Symptom, Timing/Onset, Context/Setting, Quality, Duration, Modifying Factors, Severity)  Note limiting factors.   Lucrecia Villareal is a 28 y.o. female who presents to the emergency department      The history is provided by the patient. No  was used.   Cough  Cough characteristics:  Productive  Sputum characteristics:  Green  Severity:  Mild  Onset quality:  Gradual  Timing:  Constant  Progression:  Worsening  Chronicity:  Recurrent  Smoker: no    Context: upper respiratory infection    Ineffective treatments:  None tried  Associated symptoms: rhinorrhea and sinus congestion    Associated symptoms: no chest pain, no chills, no ear fullness, no ear pain, no fever, no headaches and no shortness of breath        Nursing Notes were reviewed.    REVIEW OF SYSTEMS    (2-9 systems for level 4, 10 or more for level 5)     Review of Systems   Constitutional:  Negative for activity change, chills and fever.   HENT:  Positive for rhinorrhea. Negative for ear pain and nosebleeds.    Eyes:  Negative for visual disturbance.   Respiratory:  Positive for cough. Negative for shortness of breath.    Cardiovascular:  Negative for chest pain and palpitations.   Gastrointestinal:  Negative for abdominal pain, constipation, diarrhea, nausea and vomiting.   Genitourinary:  Negative for difficulty urinating, dysuria, hematuria and urgency.   Musculoskeletal:  Negative for back pain, neck pain and neck stiffness.   Skin:  Negative for color change.   Allergic/Immunologic: Negative for immunocompromised state.   Neurological:  Negative for dizziness, seizures, syncope, weakness, light-headedness, numbness and headaches.

## 2024-07-01 ENCOUNTER — HOSPITAL ENCOUNTER (EMERGENCY)
Facility: HOSPITAL | Age: 29
Discharge: HOME | End: 2024-07-01
Attending: STUDENT IN AN ORGANIZED HEALTH CARE EDUCATION/TRAINING PROGRAM
Payer: OTHER GOVERNMENT

## 2024-07-01 VITALS
WEIGHT: 134 LBS | DIASTOLIC BLOOD PRESSURE: 71 MMHG | TEMPERATURE: 98.6 F | HEIGHT: 65 IN | HEART RATE: 77 BPM | RESPIRATION RATE: 16 BRPM | SYSTOLIC BLOOD PRESSURE: 117 MMHG | OXYGEN SATURATION: 100 % | BODY MASS INDEX: 22.33 KG/M2
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
## 2024-07-01 DIAGNOSIS — S80.861A TICK BITE OF RIGHT LOWER LEG, INITIAL ENCOUNTER: ICD-10-CM

## 2024-07-01 DIAGNOSIS — R68.89 FLU-LIKE SYMPTOMS: Primary | ICD-10-CM

## 2024-07-01 PROCEDURE — 99283 EMERGENCY DEPT VISIT LOW MDM: CPT

## 2024-07-01 RX ORDER — AMOXICILLIN 875 MG/1
875 TABLET, COATED ORAL 2 TIMES DAILY
Qty: 28 TABLET | Refills: 0 | Status: SHIPPED | OUTPATIENT
Start: 2024-07-01 | End: 2024-07-15

## 2024-07-01 ASSESSMENT — PAIN DESCRIPTION - PAIN TYPE: TYPE: ACUTE PAIN

## 2024-07-01 ASSESSMENT — PAIN DESCRIPTION - LOCATION: LOCATION: THROAT

## 2024-07-01 ASSESSMENT — LIFESTYLE VARIABLES: HOW OFTEN DO YOU HAVE A DRINK CONTAINING ALCOHOL: NEVER

## 2024-07-01 ASSESSMENT — PAIN - FUNCTIONAL ASSESSMENT: PAIN_FUNCTIONAL_ASSESSMENT: 0-10

## 2024-07-01 ASSESSMENT — PAIN DESCRIPTION - DESCRIPTORS: DESCRIPTORS: SORE

## 2024-07-01 ASSESSMENT — PAIN SCALES - GENERAL: PAINLEVEL_OUTOF10: 6

## 2024-07-01 NOTE — ED NOTES
The patient was discharged home by  in stable condition. The patient is alert and oriented, in no respiratory distress and discharge vital signs obtained. The patient's diagnosis, condition and treatment were explained. The patient expressed understanding. Prescriptions given/e-scribed to pharmacy. Work/school note given. A discharge plan has been developed. A  was not involved in the process. Aftercare instructions were given.  Pt ambulatory out of ED with steady gait.

## 2024-07-01 NOTE — DISCHARGE INSTRUCTIONS
You are seen in the emergency department today for persistent congestion, cough, and recent tick exposure.  Your history and exam were concerning for possible tickborne illness such as Lyme, ehrlichiosis, or babesiosis.  You have been prescribed an antibiotic, amoxicillin, for a 14-day course for treatment of this in pregnancy.  You should call to schedule follow-up with both your primary care and obstetrician for further reevaluation and management of the symptoms.  You should return to the emergency department for reevaluation if you develop fever, throat swelling with difficulty swallowing, difficulty breathing, or other new and concerning symptom.

## 2024-07-01 NOTE — ED TRIAGE NOTES
Patient presents ambulatory to treatment area with a steady gait.  Patient complains of chest and sinus congestion with low grade fevers and sore throat that have persisted since she was recently on amoxicillin.  Symptoms intensified last evening.  Denies chest pain or shortness of breath recently.  Cough is productive of \"mucus\" but has not inspected color.  Patient is 19 weeks pregnant

## 2024-07-02 NOTE — ED PROVIDER NOTES
[facility] EMERGENCY DEPT  EMERGENCY DEPARTMENT ENCOUNTER      Pt Name: Lucrecia Villareal  MRN: [id number]  Birthdate 1995  Date of evaluation: 2024  Provider: Esperanza Figueroa MD    CHIEF COMPLAINT       Chief Complaint   Patient presents with    Cough    Nasal Congestion         HISTORY OF PRESENT ILLNESS    Review of Medical Records:     Nursing Triage Notes were reviewed.    HPI    Lucrecia Villareal is a 28 y.o.-year-old -0-1-1 female with a history of prior  and prior loss of pregnancy at 21 weeks secondary to PPROM, who presents to the emergency department for evaluation of flulike symptoms.  Patient reports that she was seen here for evaluation of productive cough, rhinorrhea, and sinus congestion approximately 3 weeks ago.  States that symptoms had been present for about a week prior to that visit.  States that she was discharged home on amoxicillin for sinusitis.  States that symptoms did improve significantly, until yesterday evening.  Patient complains that she now has developed a new productive cough, low-grade fevers in the 99 degree range, sore throat, sinus and chest congestion.  Patient expresses concern because she had removed a tick from her left ankle approximately 8 days ago and is not certain how long the tick was attached.  Complains that she has since developed a small bull's-eye rash in the area that the tick was removed.  Denies any associated pain or irritation in the area of the rash.        PAST MEDICAL HISTORY     Past Medical History:   Diagnosis Date    Mental disorder     Supervision of high risk pregnancy, antepartum 2023    EDC  2023 D=US (ACOG sary) Di/di twins A is S<<D - demise A first trimester Prior LTCS breech Covid vaccine declines Flu vaccine Horizon - normal NIPTS identical twin girls (now [place]) MFM [facility] AFP screen negative          SURGICAL HISTORY       Past Surgical History:   Procedure Laterality Date    ABDOMEN SURGERY

## 2024-10-30 ENCOUNTER — OFFICE VISIT (OUTPATIENT)
Facility: CLINIC | Age: 29
End: 2024-10-30
Payer: OTHER GOVERNMENT

## 2024-10-30 VITALS
HEART RATE: 79 BPM | OXYGEN SATURATION: 98 % | WEIGHT: 160 LBS | RESPIRATION RATE: 20 BRPM | SYSTOLIC BLOOD PRESSURE: 108 MMHG | TEMPERATURE: 97.3 F | BODY MASS INDEX: 26.66 KG/M2 | HEIGHT: 65 IN | DIASTOLIC BLOOD PRESSURE: 68 MMHG

## 2024-10-30 DIAGNOSIS — Z3A.26 26 WEEKS GESTATION OF PREGNANCY: ICD-10-CM

## 2024-10-30 DIAGNOSIS — F90.0 ATTENTION DEFICIT HYPERACTIVITY DISORDER (ADHD), PREDOMINANTLY INATTENTIVE TYPE: ICD-10-CM

## 2024-10-30 DIAGNOSIS — F43.23 ADJUSTMENT DISORDER WITH MIXED ANXIETY AND DEPRESSED MOOD: Primary | ICD-10-CM

## 2024-10-30 PROCEDURE — 99214 OFFICE O/P EST MOD 30 MIN: CPT | Performed by: FAMILY MEDICINE

## 2024-10-30 RX ORDER — SERTRALINE HYDROCHLORIDE 25 MG/1
25 TABLET, FILM COATED ORAL DAILY
COMMUNITY
Start: 2024-09-17 | End: 2024-11-01 | Stop reason: SDUPTHER

## 2024-10-30 SDOH — ECONOMIC STABILITY: FOOD INSECURITY: WITHIN THE PAST 12 MONTHS, YOU WORRIED THAT YOUR FOOD WOULD RUN OUT BEFORE YOU GOT MONEY TO BUY MORE.: NEVER TRUE

## 2024-10-30 SDOH — ECONOMIC STABILITY: FOOD INSECURITY: WITHIN THE PAST 12 MONTHS, THE FOOD YOU BOUGHT JUST DIDN'T LAST AND YOU DIDN'T HAVE MONEY TO GET MORE.: NEVER TRUE

## 2024-10-30 SDOH — ECONOMIC STABILITY: INCOME INSECURITY: HOW HARD IS IT FOR YOU TO PAY FOR THE VERY BASICS LIKE FOOD, HOUSING, MEDICAL CARE, AND HEATING?: NOT HARD AT ALL

## 2024-10-30 NOTE — PATIENT INSTRUCTIONS
The #1 complication of pregnancy and childbirth is  Mood & Anxiety Disorders (PMADs)-most commonly, anxiety and depression.  The good news is PMADs are temporary and can be treated with a combination of self-care, social support, talk therapy, and medication if necessary.    Call/Text the Warmline    Someone is standing by, ready to talk to you whenever you need support.   A warmline is different from an hotline, in that we don’t always answer immediately but all calls are returned within 12 hours. [phone] to call, or [phone] to text.     [email or web address].[email or web address]-parents/    -------    Approximately 15 to 20 percent of all women experience some form of pregnancy-related depression or anxiety. If this happens to you, know that you are not alone.  If you are experiencing thoughts of suicide, thoughts of self-harm and/or harming others, please call or text [phone], call 342, go to your nearest emergency room or contact your local crisis line below:  [place] [facility] crisis: [phone]  [place] [facility] crisis: [phone]  [place] [facility] crisis: [phone]  [facility] crisis: [phone]    --------  Getting Better Together: Postpartum Support Group for Moms  Our Getting Better Together Postpartum Support Group provides peer support for moms who may be experiencing depression or anxiety during their transition to motherhood.    In person groups meet the  of each month from 9:30 a.m. to 10:30 a.m. at [facility] in [facility]. Online Zoom groups meet the  of each month from 4:00 p.m. to 5:00 p.m.     [email or web address]    ---------------------------------------    MELISSA Pito Barba has a facebook group and they meet the  of every month at the [facility] from 7-830p - drop in is great and mark

## 2024-10-30 NOTE — PROGRESS NOTES
Chief Complaint   Patient presents with    New Patient     Discuss Zoloft RX- Currently taking 125 MG ( Depression )   Discuss ADHD work up - after pregnancy ( neurophysiology evaluation in chart )       \"Have you been to the ER, urgent care clinic since your last visit?  Hospitalized since your last visit?\"    NO    “Have you seen or consulted any other health care providers outside of [facility] since your last visit?”    NO            Click Here for Release of Records Request  
excuse any errors that have escaped final proofreading.  Thank you.     The patient (or guardian, if applicable) and other individuals in attendance with the patient were advised that Artificial Intelligence will be utilized during this visit to record and process the conversation to generate a clinical note. The patient (or guardian, if applicable) and other individuals in attendance at the appointment consented to the use of AI, including the recording.      Spent 30 minutes in education, discussion of disease process, medication profile and possible side effects. coordinating care with staff     LUCIO Boyd CNP  [facility]  [phone]

## 2024-11-01 ENCOUNTER — PATIENT MESSAGE (OUTPATIENT)
Facility: CLINIC | Age: 29
End: 2024-11-01

## 2024-11-01 RX ORDER — SERTRALINE HYDROCHLORIDE 100 MG/1
100 TABLET, FILM COATED ORAL DAILY
Qty: 90 TABLET | Refills: 1 | Status: SHIPPED | OUTPATIENT
Start: 2024-11-01

## 2024-11-01 RX ORDER — SERTRALINE HYDROCHLORIDE 25 MG/1
25 TABLET, FILM COATED ORAL DAILY
Qty: 90 TABLET | Refills: 1 | Status: SHIPPED | OUTPATIENT
Start: 2024-11-01

## 2024-11-02 ENCOUNTER — HOSPITAL ENCOUNTER (EMERGENCY)
Facility: HOSPITAL | Age: 29
Discharge: HOME | End: 2024-11-02
Attending: STUDENT IN AN ORGANIZED HEALTH CARE EDUCATION/TRAINING PROGRAM
Payer: OTHER GOVERNMENT

## 2024-11-02 VITALS
OXYGEN SATURATION: 98 % | HEIGHT: 65 IN | WEIGHT: 160 LBS | DIASTOLIC BLOOD PRESSURE: 60 MMHG | BODY MASS INDEX: 26.66 KG/M2 | RESPIRATION RATE: 16 BRPM | HEART RATE: 93 BPM | TEMPERATURE: 98.2 F | SYSTOLIC BLOOD PRESSURE: 107 MMHG

## 2024-11-02 DIAGNOSIS — J06.9 ACUTE UPPER RESPIRATORY INFECTION: Primary | ICD-10-CM

## 2024-11-02 DIAGNOSIS — R09.81 NASAL CONGESTION: ICD-10-CM

## 2024-11-02 PROCEDURE — 99283 EMERGENCY DEPT VISIT LOW MDM: CPT

## 2024-11-02 PROCEDURE — 6370000000 HC RX 637 (ALT 250 FOR IP): Performed by: STUDENT IN AN ORGANIZED HEALTH CARE EDUCATION/TRAINING PROGRAM

## 2024-11-02 RX ORDER — ACETAMINOPHEN 325 MG/1
650 TABLET ORAL
Status: COMPLETED | OUTPATIENT
Start: 2024-11-02 | End: 2024-11-02

## 2024-11-02 RX ORDER — FAMOTIDINE 40 MG/1
40 TABLET, FILM COATED ORAL 2 TIMES DAILY
COMMUNITY

## 2024-11-02 RX ORDER — FLUTICASONE PROPIONATE 50 MCG
2 SPRAY, SUSPENSION (ML) NASAL ONCE
Status: DISCONTINUED | OUTPATIENT
Start: 2024-11-02 | End: 2024-11-02 | Stop reason: HOSPADM

## 2024-11-02 RX ORDER — FLUTICASONE PROPIONATE 50 MCG
2 SPRAY, SUSPENSION (ML) NASAL DAILY PRN
Qty: 48 G | Refills: 0 | Status: SHIPPED | OUTPATIENT
Start: 2024-11-02 | End: 2024-11-12

## 2024-11-02 RX ADMIN — ACETAMINOPHEN 650 MG: 325 TABLET ORAL at 08:52

## 2024-11-02 ASSESSMENT — LIFESTYLE VARIABLES
HOW OFTEN DO YOU HAVE A DRINK CONTAINING ALCOHOL: NEVER
HOW MANY STANDARD DRINKS CONTAINING ALCOHOL DO YOU HAVE ON A TYPICAL DAY: PATIENT DOES NOT DRINK

## 2024-11-02 ASSESSMENT — PAIN DESCRIPTION - ORIENTATION: ORIENTATION: INNER

## 2024-11-02 ASSESSMENT — PAIN DESCRIPTION - FREQUENCY: FREQUENCY: CONTINUOUS

## 2024-11-02 ASSESSMENT — PAIN - FUNCTIONAL ASSESSMENT: PAIN_FUNCTIONAL_ASSESSMENT: ACTIVITIES ARE NOT PREVENTED

## 2024-11-02 ASSESSMENT — PAIN DESCRIPTION - ONSET: ONSET: SUDDEN

## 2024-11-02 ASSESSMENT — PAIN DESCRIPTION - LOCATION: LOCATION: THROAT

## 2024-11-02 ASSESSMENT — PAIN SCALES - GENERAL: PAINLEVEL_OUTOF10: 7

## 2024-11-02 ASSESSMENT — PAIN DESCRIPTION - DESCRIPTORS: DESCRIPTORS: SORE

## 2024-11-02 ASSESSMENT — PAIN DESCRIPTION - PAIN TYPE: TYPE: ACUTE PAIN

## 2024-11-02 NOTE — ED PROVIDER NOTES
[facility] EMERGENCY DEPT  EMERGENCY DEPARTMENT ENCOUNTER      Pt Name: Lucrecia Villareal  MRN: [id number]  Birthdate 1995  Date of evaluation: 2024  Provider: Esperanza Figueroa MD    CHIEF COMPLAINT       Chief Complaint   Patient presents with    Cough    Fever    Nasal Congestion         HISTORY OF PRESENT ILLNESS    Nursing Triage Notes were reviewed.    HPI    Lucrecia Villareal is a 29 y.o. -0-1-1 female with a history of prior pregnancy with PPROM who presents to the emergency department for evaluation of sinus congestion and cough.  Patient complains that she has had the symptoms since Monday or Tuesday.  Complains of sinus congestion with thick green mucus.  Complains of frequent cough with episode of posttussive emesis overnight.  Complains that her voice is now hoarse from frequent coughing.  Complains of associated myalgias and chills.  States that she took Tylenol once yesterday for fever of 101 °F in the afternoon with improvement in fever.  Denies any associated shortness of breath or chest pain.  Reports she has been prescribed azithromycin which she took the first dose of yesterday.  Denies any loss of fluid or vaginal bleeding.  Is scheduled to see her OB on Wednesday.        PAST MEDICAL HISTORY     Past Medical History:   Diagnosis Date    Anxiety     Mental disorder     Supervision of high risk pregnancy, antepartum 2023    EDC  2023 D=US (ACOG sary) Di/di twins A is S<<D - demise A first trimester Prior LTCS breech Covid vaccine declines Flu vaccine Horizon - normal NIPTS identical twin girls (now [place]) MFM [facility] AFP screen negative          SURGICAL HISTORY       Past Surgical History:   Procedure Laterality Date    ABDOMEN SURGERY           CURRENT MEDICATIONS       Current Discharge Medication List        CONTINUE these medications which have NOT CHANGED    Details   famotidine (PEPCID) 40 MG tablet Take 1 tablet by mouth 2 times daily      Prenatal MV-Min-Fe  (congestion, nasal drainage)  Qty: 48 g, Refills: 0             (Please note that portions of this note were completed with a voice recognition program.  Efforts were made to edit the dictations but occasionally words are mis-transcribed.)    Esperanza Figueroa MD (electronically signed)  Emergency Attending Physician       Esperanza Figueroa MD  11/02/24 0825

## 2024-11-02 NOTE — ED TRIAGE NOTES
Pt ambulated to the the treatment area with a steady gait. Pt states \"for a few days I have had sinus congestion and a cough with thick green mucus also thick green mucus from my nose I started a z pac yesterday last night it seemed to move more to my chest I had a fever 101 yesterday. I am 37 weeks pregnant no issues with pregnancy.\"

## 2024-11-02 NOTE — ED NOTES
The patient was discharged home by Dr Figueroa  in stable condition. The patient is alert and oriented, in no respiratory distress and discharge vital signs obtained. The patient's diagnosis, condition and treatment were explained. The patient expressed understanding. Prescriptions given/e-scribed to pharmacy. No work/school note given. A discharge plan has been developed. A  was not involved in the process. Aftercare instructions were given.  Pt ambulatory out of the ED.

## 2024-11-02 NOTE — DISCHARGE INSTRUCTIONS
You were evaluated in the Emergency Department today for a cough. You can take fluticasone for any nasal congestion or postnasal drip, cough drops, and try drinking hot tea with honey or gargling salt water to help manage your symptoms. Continue to take your azithromycin as prescribed.     Please schedule an appointment for follow up with your primary care physician as soon as possible. Follow up with your OB as scheduled on Wednesday.     Return to the Emergency Department if you experience fever 100.4 ° F or greater despite ibuprofen or acetaminophen, recurrent vomiting, chest pain, shortness of breath, palpitations, lightheadedness, dizziness or any other concerning symptoms.    Thank you for choosing us for your care.

## (undated) DEVICE — SUTURE MCRYL SZ 0 L36IN ABSRB UD L36MM CT-1 1/2 CIR Y946H

## (undated) DEVICE — CATH FOLEY 16F LUBRI-SIL IC --

## (undated) DEVICE — STRIP,CLOSURE,WOUND,MEDI-STRIP,1/2X4: Brand: MEDLINE

## (undated) DEVICE — STAPLER SKIN SQ 30 ABSRB STPL DISP INSORB

## (undated) DEVICE — TRAY,URINE METER,100% SILICONE,16FR10ML: Brand: MEDLINE

## (undated) DEVICE — BLADE ASSEMB CLP HAIR FINE --

## (undated) DEVICE — SOLUTION IRRIG 1000ML H2O STRL BLT

## (undated) DEVICE — STERILE LATEX POWDER-FREE SURGICAL GLOVESWITH NITRILE COATING: Brand: PROTEXIS

## (undated) DEVICE — HANDLE LT SNAP ON ULT DURABLE LENS FOR TRUMPF ALC DISPOSABLE

## (undated) DEVICE — (D)PREP SKN CHLRAPRP APPL 26ML -- CONVERT TO ITEM 371833

## (undated) DEVICE — 3000CC GUARDIAN II: Brand: GUARDIAN

## (undated) DEVICE — SOLIDIFIER FLUID 3000 CC ABSORB

## (undated) DEVICE — TIP CLEANER: Brand: VALLEYLAB

## (undated) DEVICE — GARMENT,MEDLINE,DVT,INT,CALF,MED, GEN2: Brand: MEDLINE

## (undated) DEVICE — MASTISOL ADHESIVE LIQ 2/3ML

## (undated) DEVICE — C-SECTION II-LF: Brand: MEDLINE INDUSTRIES, INC.

## (undated) DEVICE — TOWEL,OR,DSP,ST,BLUE,STD,2/PK,40PK/CS: Brand: MEDLINE

## (undated) DEVICE — REM POLYHESIVE ADULT PATIENT RETURN ELECTRODE: Brand: VALLEYLAB

## (undated) DEVICE — STERILE POLYISOPRENE POWDER-FREE SURGICAL GLOVES WITH EMOLLIENT COATING: Brand: PROTEXIS

## (undated) DEVICE — SYRINGE IRRIG 60ML SFT PLIABLE BLB EZ TO GRP 1 HND USE W/

## (undated) DEVICE — ROCKER SWITCH PENCIL HOLSTER: Brand: VALLEYLAB

## (undated) DEVICE — LARGE, DISPOSABLE ALEXIS O C-SECTION PROTECTOR - RETRACTOR: Brand: ALEXIS ® O C-SECTION PROTECTOR - RETRACTOR

## (undated) DEVICE — STERILE POLYISOPRENE POWDER-FREE SURGICAL GLOVES: Brand: PROTEXIS

## (undated) DEVICE — 3M™ MEDIPORE™ H SOFT CLOTH SURGICAL TAPE, 2863, 3 IN X 10 YD, 12/CASE: Brand: 3M™ MEDIPORE™

## (undated) DEVICE — SOLUTION IV 1000ML 0.9% SOD CHL

## (undated) DEVICE — PAD,ABDOMINAL,5"X9",ST,LF,25/BX: Brand: MEDLINE INDUSTRIES, INC.

## (undated) DEVICE — SUTURE STRATAFIX SYMMETRIC PDS + SZ 1 L18IN ABSRB VLT L48MM SXPP1A400

## (undated) DEVICE — GOWN,AURORA,FABRIC-REINFORCED,X-LARGE: Brand: MEDLINE